# Patient Record
Sex: MALE | Race: WHITE | NOT HISPANIC OR LATINO | Employment: OTHER | URBAN - NONMETROPOLITAN AREA
[De-identification: names, ages, dates, MRNs, and addresses within clinical notes are randomized per-mention and may not be internally consistent; named-entity substitution may affect disease eponyms.]

---

## 2023-08-31 ENCOUNTER — OFFICE VISIT (OUTPATIENT)
Dept: CARDIOLOGY | Facility: CLINIC | Age: 68
End: 2023-08-31
Payer: MEDICARE

## 2023-08-31 VITALS
OXYGEN SATURATION: 96 % | WEIGHT: 171.4 LBS | HEART RATE: 73 BPM | DIASTOLIC BLOOD PRESSURE: 68 MMHG | SYSTOLIC BLOOD PRESSURE: 108 MMHG

## 2023-08-31 DIAGNOSIS — I25.10 CORONARY ARTERY DISEASE INVOLVING NATIVE CORONARY ARTERY OF NATIVE HEART WITHOUT ANGINA PECTORIS: Primary | ICD-10-CM

## 2023-08-31 DIAGNOSIS — E78.5 DYSLIPIDEMIA: ICD-10-CM

## 2023-08-31 DIAGNOSIS — I10 PRIMARY HYPERTENSION: ICD-10-CM

## 2023-08-31 PROCEDURE — 99204 OFFICE O/P NEW MOD 45 MIN: CPT | Performed by: PHYSICIAN ASSISTANT

## 2023-08-31 PROCEDURE — 93000 ELECTROCARDIOGRAM COMPLETE: CPT | Performed by: PHYSICIAN ASSISTANT

## 2023-08-31 PROCEDURE — 3078F DIAST BP <80 MM HG: CPT | Performed by: PHYSICIAN ASSISTANT

## 2023-08-31 PROCEDURE — 3074F SYST BP LT 130 MM HG: CPT | Performed by: PHYSICIAN ASSISTANT

## 2023-08-31 RX ORDER — ROSUVASTATIN CALCIUM 5 MG/1
5 TABLET, COATED ORAL NIGHTLY
COMMUNITY

## 2023-08-31 RX ORDER — OMEPRAZOLE 40 MG/1
40 CAPSULE, DELAYED RELEASE ORAL DAILY
COMMUNITY

## 2023-08-31 RX ORDER — CARVEDILOL 3.12 MG/1
3.12 TABLET ORAL 2 TIMES DAILY WITH MEALS
COMMUNITY

## 2023-08-31 RX ORDER — ASPIRIN 81 MG/1
81 TABLET ORAL DAILY
COMMUNITY

## 2023-08-31 NOTE — PROGRESS NOTES
Subjective   Lyndon Sotelo is a 68 y.o. male     Chief Complaint   Patient presents with    [facility]     Records in chart from  -  Stress and Echo records also in chart    Problem list  1.  Coronary artery disease  1.1 inferior STEMI June 2020 with PCI to the proximal RCA  1.2 follow-up catheterization the next day for stent placement the proximal to mid LAD.  1.3 stress test December 2022 with no evidence of ischemia preserved LV function.  2.  Preserved systolic function  3.  Dyslipidemia    HPI    Patient is a 68-year-old male who presents back to the office to [facility].  He was previously seen through the [facility].    Clinically, patient feels well.  Patient does not describe any chest pain or pressure.  No complaints of dyspnea.  No PND or orthopnea.    Patient does not palpitate nor does he complain of dysrhythmic symptoms.  Clinically, he is feeling remarkable.    Current Outpatient Medications   Medication Sig Dispense Refill    aspirin 81 MG EC tablet Take 1 tablet by mouth Daily.      carvedilol (COREG) 3.125 MG tablet Take 1 tablet by mouth 2 (Two) Times a Day With Meals.      omeprazole (priLOSEC) 40 MG capsule Take 1 capsule by mouth Daily.      rosuvastatin (CRESTOR) 5 MG tablet Take 1 tablet by mouth Every Night.       No current facility-administered medications for this visit.       Sulfa antibiotics and Lorcet [hydrocodone-acetaminophen]    Past Medical History:   Diagnosis Date    Acid reflux     Diverticulitis     Hyperlipidemia     Hypertension     TIA (transient ischemic attack)        Social History     Socioeconomic History    Marital status:    Tobacco Use    Smoking status: Every Day     Types: Cigarettes    Smokeless tobacco: Never   Substance and Sexual Activity    Alcohol use: Never    Drug use: Never    Sexual activity: Defer       Family History   Problem Relation Age of Onset    Rheumatic fever Mother     Emphysema Father        Review of Systems    Constitutional:  Positive for fatigue. Negative for chills and fever.   HENT:  Positive for rhinorrhea. Negative for congestion and sore throat.    Eyes:  Positive for visual disturbance (glasses).   Respiratory: Negative.  Negative for chest tightness, shortness of breath and wheezing.    Cardiovascular: Negative.  Negative for chest pain, palpitations and leg swelling.   Gastrointestinal: Negative.    Endocrine: Negative.    Genitourinary: Negative.    Musculoskeletal: Negative.  Negative for arthralgias, back pain and neck pain.   Skin: Negative.    Allergic/Immunologic: Positive for environmental allergies.   Neurological:  Positive for dizziness. Negative for weakness, numbness and headaches.   Hematological:  Bruises/bleeds easily.   Psychiatric/Behavioral: Negative.  Negative for sleep disturbance.      Objective   Vitals:    08/31/23 1420   BP: 108/68   BP Location: Left arm   Patient Position: Sitting   Cuff Size: Adult   Pulse: 73   SpO2: 96%   Weight: 77.7 kg (171 lb 6.4 oz)      /68 (BP Location: Left arm, Patient Position: Sitting, Cuff Size: Adult)   Pulse 73   Wt 77.7 kg (171 lb 6.4 oz)   SpO2 96%     Lab Results (most recent)       None            Physical Exam  Vitals and nursing note reviewed.   Constitutional:       General: He is not in acute distress.     Appearance: Normal appearance. He is well-developed.   HENT:      Head: Normocephalic and atraumatic.   Eyes:      General: No scleral icterus.        Right eye: No discharge.         Left eye: No discharge.      Conjunctiva/sclera: Conjunctivae normal.   Neck:      Vascular: No carotid bruit.   Cardiovascular:      Rate and Rhythm: Normal rate and regular rhythm.      Heart sounds: Normal heart sounds. No murmur heard.    No friction rub. No gallop.   Pulmonary:      Effort: Pulmonary effort is normal. No respiratory distress.      Breath sounds: Normal breath sounds. No wheezing or rales.   Chest:      Chest wall: No tenderness.    Musculoskeletal:      Right lower leg: No edema.      Left lower leg: No edema.   Skin:     General: Skin is warm and dry.      Coloration: Skin is not pale.      Findings: No erythema or rash.   Neurological:      Mental Status: He is alert and oriented to person, place, and time.      Cranial Nerves: No cranial nerve deficit.   Psychiatric:         Behavior: Behavior normal.       Procedure     ECG 12 Lead    Date/Time: 8/31/2023 2:36 PM  Performed by: Kelvin Woodward PA  Authorized by: Kelvin Woodward PA   Comparison: not compared with previous ECG   Comments: EKG demonstrates sinus rhythm at 67 bpm with nonspecific IVCD, first-degree AV block and no acute ST changes             Assessment & Plan     Problems Addressed this Visit          Cardiac and Vasculature    Primary hypertension    Relevant Medications    carvedilol (COREG) 3.125 MG tablet    Dyslipidemia    Coronary artery disease involving native coronary artery of native heart without angina pectoris - Primary    Relevant Medications    carvedilol (COREG) 3.125 MG tablet     Diagnoses         Codes Comments    Coronary artery disease involving native coronary artery of native heart without angina pectoris    -  Primary ICD-10-CM: I25.10  ICD-9-CM: 414.01     Dyslipidemia     ICD-10-CM: E78.5  ICD-9-CM: 272.4     Primary hypertension     ICD-10-CM: I10  ICD-9-CM: 401.9             Recommendation  1.  Patient is a 68-year-old male who presents to the office today and is doing well.  No complaints of angina, failure, or dysrhythmic symptoms.  Patient had stress test last December and there was no evidence of ischemia.  Strong systolic function.  We will continue medical therapy.    2.  I have reviewed lipid parameters in April from his primary care provider and LDL was significantly elevated.  He was placed on Crestor.  If tolerable, would like a higher dose of Crestor but he recently had labs performed by his primary approximately 3 weeks ago.  We  can wait to review those labs.    3 patient with baseline hypertension and seems on carvedilol.  He is doing well.  We will continue the same and see him back for follow-up in 6 months or sooner as symptoms discussed.  Follow-up with primary as scheduled.           Lyndon Ashleigh  reports that he has been smoking cigarettes. He has never used smokeless tobacco.. I have educated him on the risk of diseases from using tobacco products such as     I advised him to quit and he is not willing to quit.    I spent 5 minutes counseling the patient.          Advance Care Planning   ACP discussion was held with the patient during this visit. Patient does not have an advance directive, declines further assistance.          Electronically signed by:

## 2023-08-31 NOTE — LETTER
August 31, 2023       No Recipients    Patient: Lyndon Sotelo   YOB: 1955   Date of Visit: 8/31/2023       Dear Nava Schaffer MD,    Thank you for referring Lyndon Sotelo to me for evaluation. Below is a copy of my consult note.    If you have questions, please do not hesitate to call me. I look forward to following Lyndon along with you.         Sincerely,        DONY Lopez        CC:   No Recipients    Subjective  Lyndon Sotelo is a 68 y.o. male     Chief Complaint   Patient presents with    [facility]     Records in chart from  -  Stress and Echo records also in chart    Problem list  1.  Coronary artery disease  1.1 inferior STEMI June 2020 with PCI to the proximal RCA  1.2 follow-up catheterization the next day for stent placement the proximal to mid LAD.  1.3 stress test December 2022 with no evidence of ischemia preserved LV function.  2.  Preserved systolic function  3.  Dyslipidemia    HPI    Patient is a 68-year-old male who presents back to the office to [facility].  He was previously seen through the [facility].    Clinically, patient feels well.  Patient does not describe any chest pain or pressure.  No complaints of dyspnea.  No PND or orthopnea.    Patient does not palpitate nor does he complain of dysrhythmic symptoms.  Clinically, he is feeling remarkable.    Current Outpatient Medications   Medication Sig Dispense Refill    aspirin 81 MG EC tablet Take 1 tablet by mouth Daily.      carvedilol (COREG) 3.125 MG tablet Take 1 tablet by mouth 2 (Two) Times a Day With Meals.      omeprazole (priLOSEC) 40 MG capsule Take 1 capsule by mouth Daily.      rosuvastatin (CRESTOR) 5 MG tablet Take 1 tablet by mouth Every Night.       No current facility-administered medications for this visit.       Sulfa antibiotics and Lorcet [hydrocodone-acetaminophen]    Past Medical History:   Diagnosis Date    Acid reflux     Diverticulitis     Hyperlipidemia      Hypertension     TIA (transient ischemic attack)        Social History     Socioeconomic History    Marital status:    Tobacco Use    Smoking status: Every Day     Types: Cigarettes    Smokeless tobacco: Never   Substance and Sexual Activity    Alcohol use: Never    Drug use: Never    Sexual activity: Defer       Family History   Problem Relation Age of Onset    Rheumatic fever Mother     Emphysema Father        Review of Systems   Constitutional:  Positive for fatigue. Negative for chills and fever.   HENT:  Positive for rhinorrhea. Negative for congestion and sore throat.    Eyes:  Positive for visual disturbance (glasses).   Respiratory: Negative.  Negative for chest tightness, shortness of breath and wheezing.    Cardiovascular: Negative.  Negative for chest pain, palpitations and leg swelling.   Gastrointestinal: Negative.    Endocrine: Negative.    Genitourinary: Negative.    Musculoskeletal: Negative.  Negative for arthralgias, back pain and neck pain.   Skin: Negative.    Allergic/Immunologic: Positive for environmental allergies.   Neurological:  Positive for dizziness. Negative for weakness, numbness and headaches.   Hematological:  Bruises/bleeds easily.   Psychiatric/Behavioral: Negative.  Negative for sleep disturbance.      Objective  Vitals:    08/31/23 1420   BP: 108/68   BP Location: Left arm   Patient Position: Sitting   Cuff Size: Adult   Pulse: 73   SpO2: 96%   Weight: 77.7 kg (171 lb 6.4 oz)      /68 (BP Location: Left arm, Patient Position: Sitting, Cuff Size: Adult)   Pulse 73   Wt 77.7 kg (171 lb 6.4 oz)   SpO2 96%     Lab Results (most recent)       None            Physical Exam  Vitals and nursing note reviewed.   Constitutional:       General: He is not in acute distress.     Appearance: Normal appearance. He is well-developed.   HENT:      Head: Normocephalic and atraumatic.   Eyes:      General: No scleral icterus.        Right eye: No discharge.         Left  eye: No discharge.      Conjunctiva/sclera: Conjunctivae normal.   Neck:      Vascular: No carotid bruit.   Cardiovascular:      Rate and Rhythm: Normal rate and regular rhythm.      Heart sounds: Normal heart sounds. No murmur heard.    No friction rub. No gallop.   Pulmonary:      Effort: Pulmonary effort is normal. No respiratory distress.      Breath sounds: Normal breath sounds. No wheezing or rales.   Chest:      Chest wall: No tenderness.   Musculoskeletal:      Right lower leg: No edema.      Left lower leg: No edema.   Skin:     General: Skin is warm and dry.      Coloration: Skin is not pale.      Findings: No erythema or rash.   Neurological:      Mental Status: He is alert and oriented to person, place, and time.      Cranial Nerves: No cranial nerve deficit.   Psychiatric:         Behavior: Behavior normal.       Procedure    ECG 12 Lead    Date/Time: 8/31/2023 2:36 PM  Performed by: Kelvin Woodward PA  Authorized by: Kelvin Woodward PA   Comparison: not compared with previous ECG   Comments: EKG demonstrates sinus rhythm at 67 bpm with nonspecific IVCD, first-degree AV block and no acute ST changes             Assessment & Plan    Problems Addressed this Visit          Cardiac and Vasculature    Primary hypertension    Relevant Medications    carvedilol (COREG) 3.125 MG tablet    Dyslipidemia    Coronary artery disease involving native coronary artery of native heart without angina pectoris - Primary    Relevant Medications    carvedilol (COREG) 3.125 MG tablet     Diagnoses         Codes Comments    Coronary artery disease involving native coronary artery of native heart without angina pectoris    -  Primary ICD-10-CM: I25.10  ICD-9-CM: 414.01     Dyslipidemia     ICD-10-CM: E78.5  ICD-9-CM: 272.4     Primary hypertension     ICD-10-CM: I10  ICD-9-CM: 401.9             Recommendation  1.  Patient is a 68-year-old male who presents to the office today and is doing well.  No complaints of angina,  failure, or dysrhythmic symptoms.  Patient had stress test last December and there was no evidence of ischemia.  Strong systolic function.  We will continue medical therapy.    2.  I have reviewed lipid parameters in April from his primary care provider and LDL was significantly elevated.  He was placed on Crestor.  If tolerable, would like a higher dose of Crestor but he recently had labs performed by his primary approximately 3 weeks ago.  We can wait to review those labs.    3 patient with baseline hypertension and seems on carvedilol.  He is doing well.  We will continue the same and see him back for follow-up in 6 months or sooner as symptoms discussed.  Follow-up with primary as scheduled.           Lyndon Ashleigh  reports that he has been smoking cigarettes. He has never used smokeless tobacco.. I have educated him on the risk of diseases from using tobacco products such as     I advised him to quit and he is not willing to quit.    I spent 5 minutes counseling the patient.          Advance Care Planning   ACP discussion was held with the patient during this visit. Patient does not have an advance directive, declines further assistance.          Electronically signed by:

## 2024-03-18 ENCOUNTER — OFFICE VISIT (OUTPATIENT)
Dept: CARDIOLOGY | Facility: CLINIC | Age: 69
End: 2024-03-18
Payer: MEDICARE

## 2024-03-18 VITALS
HEART RATE: 93 BPM | SYSTOLIC BLOOD PRESSURE: 134 MMHG | WEIGHT: 173 LBS | DIASTOLIC BLOOD PRESSURE: 74 MMHG | OXYGEN SATURATION: 99 %

## 2024-03-18 DIAGNOSIS — I10 PRIMARY HYPERTENSION: ICD-10-CM

## 2024-03-18 DIAGNOSIS — R07.89 CHEST PAIN, ATYPICAL: ICD-10-CM

## 2024-03-18 DIAGNOSIS — I25.10 CORONARY ARTERY DISEASE INVOLVING NATIVE CORONARY ARTERY OF NATIVE HEART WITHOUT ANGINA PECTORIS: Primary | ICD-10-CM

## 2024-03-18 DIAGNOSIS — R06.02 SHORTNESS OF BREATH: ICD-10-CM

## 2024-03-18 PROCEDURE — 3078F DIAST BP <80 MM HG: CPT | Performed by: PHYSICIAN ASSISTANT

## 2024-03-18 PROCEDURE — 3075F SYST BP GE 130 - 139MM HG: CPT | Performed by: PHYSICIAN ASSISTANT

## 2024-03-18 PROCEDURE — 99214 OFFICE O/P EST MOD 30 MIN: CPT | Performed by: PHYSICIAN ASSISTANT

## 2024-03-18 PROCEDURE — 93000 ELECTROCARDIOGRAM COMPLETE: CPT | Performed by: PHYSICIAN ASSISTANT

## 2024-03-18 RX ORDER — MELOXICAM 15 MG/1
TABLET ORAL DAILY PRN
COMMUNITY
Start: 2024-02-21

## 2024-03-18 NOTE — PROGRESS NOTES
Problem list     Subjective   Lyndon Sotelo is a 68 y.o. male     Chief Complaint   Patient presents with   • Follow-up     6 months   Problem list  1.  Coronary artery disease  1.1 inferior STEMI June 2020 with PCI to the proximal RCA  1.2 follow-up catheterization the next day for stent placement the proximal to mid LAD.  1.3 stress test December 2022 with no evidence of ischemia preserved LV function.  2.  Preserved systolic function  3.  Dyslipidemia    HPI    Patient is a 68-year-old male who presents back to the office for routine cardiac assessment.  As above, his history of coronary disease with recent stress test in December 2022 with no evidence of ischemia.    Clinically, he feels well.  He occasionally might feel some discomfort.  He describes waking up in the mornings and feeling some discomfort but does not feel it during the day.  He is not sure if it is possibly related to GI or other issues.  He has a degree of dyspnea.  He does not describe any progressive shortness of breath.  His dyspnea appears to be mild and stable.  He does not describe symptoms like what he felt previous to his myocardial infarction's in the past.  He does not describe PND or orthopnea.    No complaints of palpitations nor does he complain of dysrhythmic symptoms.  He is stable otherwise.      Current Outpatient Medications on File Prior to Visit   Medication Sig Dispense Refill   • aspirin 81 MG EC tablet Take 1 tablet by mouth Daily.     • carvedilol (COREG) 3.125 MG tablet Take 1 tablet by mouth 2 (Two) Times a Day With Meals.     • meloxicam (MOBIC) 15 MG tablet Daily As Needed.     • omeprazole (priLOSEC) 40 MG capsule Take 1 capsule by mouth Daily.     • rosuvastatin (CRESTOR) 5 MG tablet Take 1 tablet by mouth Every Night.       No current facility-administered medications on file prior to visit.       Sulfa antibiotics and Lorcet [hydrocodone-acetaminophen]    Past Medical History:   Diagnosis Date   • Acid reflux    •  Diverticulitis    • Hyperlipidemia    • Hypertension    • TIA (transient ischemic attack)        Social History     Socioeconomic History   • Marital status:    Tobacco Use   • Smoking status: Every Day     Types: Cigarettes   • Smokeless tobacco: Never   Substance and Sexual Activity   • Alcohol use: Never   • Drug use: Never   • Sexual activity: Defer       Family History   Problem Relation Age of Onset   • Rheumatic fever Mother    • Emphysema Father        Review of Systems   Constitutional:  Negative for activity change, appetite change, chills, fatigue and fever.   HENT: Negative.  Negative for congestion, sinus pressure and sinus pain.    Eyes: Negative.  Negative for visual disturbance.   Respiratory:  Positive for shortness of breath. Negative for apnea, cough, chest tightness and wheezing.    Cardiovascular:  Positive for chest pain. Negative for palpitations and leg swelling.   Gastrointestinal: Negative.  Negative for blood in stool.   Endocrine: Negative.  Negative for cold intolerance and heat intolerance.   Genitourinary: Negative.  Negative for hematuria.   Musculoskeletal: Negative.  Negative for gait problem.   Skin: Negative.  Negative for color change, rash and wound.   Allergic/Immunologic: Negative.  Negative for environmental allergies and food allergies.   Neurological: Negative.  Negative for dizziness, syncope, weakness, light-headedness, numbness and headaches.   Hematological: Negative.  Does not bruise/bleed easily.   Psychiatric/Behavioral:  Negative for sleep disturbance.        Objective   Vitals:    03/18/24 1420   BP: 134/74   BP Location: Left arm   Patient Position: Sitting   Cuff Size: Adult   Pulse: 93   SpO2: 99%   Weight: 78.5 kg (173 lb)      /74 (BP Location: Left arm, Patient Position: Sitting, Cuff Size: Adult)   Pulse 93   Wt 78.5 kg (173 lb)   SpO2 99%     Lab Results (most recent)       None            Physical Exam  Vitals and nursing note reviewed.    Constitutional:       General: He is not in acute distress.     Appearance: Normal appearance. He is well-developed.   HENT:      Head: Normocephalic and atraumatic.   Eyes:      General: No scleral icterus.        Right eye: No discharge.         Left eye: No discharge.      Conjunctiva/sclera: Conjunctivae normal.   Neck:      Vascular: No carotid bruit.   Cardiovascular:      Rate and Rhythm: Normal rate and regular rhythm.      Heart sounds: Normal heart sounds. No murmur heard.     No friction rub. No gallop.   Pulmonary:      Effort: Pulmonary effort is normal. No respiratory distress.      Breath sounds: Normal breath sounds. No wheezing or rales.   Chest:      Chest wall: No tenderness.   Musculoskeletal:      Right lower leg: No edema.      Left lower leg: No edema.   Skin:     General: Skin is warm and dry.      Coloration: Skin is not pale.      Findings: No erythema or rash.   Neurological:      Mental Status: He is alert and oriented to person, place, and time.      Cranial Nerves: No cranial nerve deficit.   Psychiatric:         Behavior: Behavior normal.       Procedure     ECG 12 Lead    Date/Time: 3/18/2024 2:24 PM  Performed by: Kelvin Woodward PA    Authorized by: Kelvin Woodward PA  Comparison: compared with previous ECG from 8/31/2023  Comments: EKG demonstrates sinus rhythm at 70 bpm, low voltage and no acute ST changes           Assessment & Plan     Problems Addressed this Visit          Cardiac and Vasculature    Primary hypertension    Coronary artery disease involving native coronary artery of native heart without angina pectoris - Primary       Pulmonary and Pneumonias    Shortness of breath       Symptoms and Signs    Chest pain, atypical     Diagnoses         Codes Comments    Coronary artery disease involving native coronary artery of native heart without angina pectoris    -  Primary ICD-10-CM: I25.10  ICD-9-CM: 414.01     Chest pain, atypical     ICD-10-CM: R07.89  ICD-9-CM:  786.59     Primary hypertension     ICD-10-CM: I10  ICD-9-CM: 401.9     Shortness of breath     ICD-10-CM: R06.02  ICD-9-CM: 786.05           Recommendation  1.  Patient is a 68-year-old male who presents to the office to be evaluated.  Patient has done well.  He has history of coronary disease.  He describes having chest pain that is atypical.  He experiences symptoms whenever he lies flat in the mornings.  He does not experience this with exertion.  We discussed testing, but he feels he is doing well and this is not similar to what he felt with previous stenting.  I would be suspicious of noncardiac etiologies.  For now, we can monitor but if this worsens or his dyspnea worsens, I want him to call the office.    2.  Patient with coronary disease currently on aspirin and statin therapy.  He has labs routinely monitored by primary.    3.  Patient's blood pressure currently stable on medical regimen and I will make no change at this time.  We will see him back for follow-up in 6 months or sooner if needed.  Follow-up with primary as scheduled.             Lyndon Sotelo  reports that he has been smoking cigarettes. He has never used smokeless tobacco. I have educated him on the risk of diseases from using tobacco products such as cancer, COPD, and heart disease.     I advised him to quit and he is not willing to quit.    I spent 3  minutes counseling the patient.          Patient did not bring med list or medicine bottles to appointment, med list has been reviewed and updated based on patient's knowledge of their meds.      Advance Care Planning   ACP discussion was declined by the patient. Patient does not have an advance directive, declines further assistance.        Electronically signed by:

## 2024-03-18 NOTE — LETTER
March 19, 2024       No Recipients    Patient: Lyndon Sotelo   YOB: 1955   Date of Visit: 3/18/2024       Dear Nava Schaffer MD    Lyndon Sotelo was in my office today. Below is a copy of my note.    If you have questions, please do not hesitate to call me. I look forward to following Lyndon along with you.         Sincerely,        DONY Lopez        CC:   No Recipients    Problem list     Subjective  Lyndon Sotelo is a 68 y.o. male     Chief Complaint   Patient presents with   • Follow-up     6 months   Problem list  1.  Coronary artery disease  1.1 inferior STEMI June 2020 with PCI to the proximal RCA  1.2 follow-up catheterization the next day for stent placement the proximal to mid LAD.  1.3 stress test December 2022 with no evidence of ischemia preserved LV function.  2.  Preserved systolic function  3.  Dyslipidemia    HPI    Patient is a 68-year-old male who presents back to the office for routine cardiac assessment.  As above, his history of coronary disease with recent stress test in December 2022 with no evidence of ischemia.    Clinically, he feels well.  He occasionally might feel some discomfort.  He describes waking up in the mornings and feeling some discomfort but does not feel it during the day.  He is not sure if it is possibly related to GI or other issues.  He has a degree of dyspnea.  He does not describe any progressive shortness of breath.  His dyspnea appears to be mild and stable.  He does not describe symptoms like what he felt previous to his myocardial infarction's in the past.  He does not describe PND or orthopnea.    No complaints of palpitations nor does he complain of dysrhythmic symptoms.  He is stable otherwise.      Current Outpatient Medications on File Prior to Visit   Medication Sig Dispense Refill   • aspirin 81 MG EC tablet Take 1 tablet by mouth Daily.     • carvedilol (COREG) 3.125 MG tablet Take 1 tablet by mouth 2 (Two) Times a Day With Meals.      • meloxicam (MOBIC) 15 MG tablet Daily As Needed.     • omeprazole (priLOSEC) 40 MG capsule Take 1 capsule by mouth Daily.     • rosuvastatin (CRESTOR) 5 MG tablet Take 1 tablet by mouth Every Night.       No current facility-administered medications on file prior to visit.       Sulfa antibiotics and Lorcet [hydrocodone-acetaminophen]    Past Medical History:   Diagnosis Date   • Acid reflux    • Diverticulitis    • Hyperlipidemia    • Hypertension    • TIA (transient ischemic attack)        Social History     Socioeconomic History   • Marital status:    Tobacco Use   • Smoking status: Every Day     Types: Cigarettes   • Smokeless tobacco: Never   Substance and Sexual Activity   • Alcohol use: Never   • Drug use: Never   • Sexual activity: Defer       Family History   Problem Relation Age of Onset   • Rheumatic fever Mother    • Emphysema Father        Review of Systems   Constitutional:  Negative for activity change, appetite change, chills, fatigue and fever.   HENT: Negative.  Negative for congestion, sinus pressure and sinus pain.    Eyes: Negative.  Negative for visual disturbance.   Respiratory:  Positive for shortness of breath. Negative for apnea, cough, chest tightness and wheezing.    Cardiovascular:  Positive for chest pain. Negative for palpitations and leg swelling.   Gastrointestinal: Negative.  Negative for blood in stool.   Endocrine: Negative.  Negative for cold intolerance and heat intolerance.   Genitourinary: Negative.  Negative for hematuria.   Musculoskeletal: Negative.  Negative for gait problem.   Skin: Negative.  Negative for color change, rash and wound.   Allergic/Immunologic: Negative.  Negative for environmental allergies and food allergies.   Neurological: Negative.  Negative for dizziness, syncope, weakness, light-headedness, numbness and headaches.   Hematological: Negative.  Does not bruise/bleed easily.   Psychiatric/Behavioral:  Negative for sleep disturbance.         Objective  Vitals:    03/18/24 1420   BP: 134/74   BP Location: Left arm   Patient Position: Sitting   Cuff Size: Adult   Pulse: 93   SpO2: 99%   Weight: 78.5 kg (173 lb)      /74 (BP Location: Left arm, Patient Position: Sitting, Cuff Size: Adult)   Pulse 93   Wt 78.5 kg (173 lb)   SpO2 99%     Lab Results (most recent)       None            Physical Exam  Vitals and nursing note reviewed.   Constitutional:       General: He is not in acute distress.     Appearance: Normal appearance. He is well-developed.   HENT:      Head: Normocephalic and atraumatic.   Eyes:      General: No scleral icterus.        Right eye: No discharge.         Left eye: No discharge.      Conjunctiva/sclera: Conjunctivae normal.   Neck:      Vascular: No carotid bruit.   Cardiovascular:      Rate and Rhythm: Normal rate and regular rhythm.      Heart sounds: Normal heart sounds. No murmur heard.     No friction rub. No gallop.   Pulmonary:      Effort: Pulmonary effort is normal. No respiratory distress.      Breath sounds: Normal breath sounds. No wheezing or rales.   Chest:      Chest wall: No tenderness.   Musculoskeletal:      Right lower leg: No edema.      Left lower leg: No edema.   Skin:     General: Skin is warm and dry.      Coloration: Skin is not pale.      Findings: No erythema or rash.   Neurological:      Mental Status: He is alert and oriented to person, place, and time.      Cranial Nerves: No cranial nerve deficit.   Psychiatric:         Behavior: Behavior normal.         Procedure    ECG 12 Lead    Date/Time: 3/18/2024 2:24 PM  Performed by: Kelvin Woodward PA    Authorized by: Kelvin Woodward PA  Comparison: compared with previous ECG from 8/31/2023             Assessment & Plan    Problems Addressed this Visit          Cardiac and Vasculature    Primary hypertension    Coronary artery disease involving native coronary artery of native heart without angina pectoris - Primary       Pulmonary and  Pneumonias    Shortness of breath       Symptoms and Signs    Chest pain, atypical     Diagnoses         Codes Comments    Coronary artery disease involving native coronary artery of native heart without angina pectoris    -  Primary ICD-10-CM: I25.10  ICD-9-CM: 414.01     Chest pain, atypical     ICD-10-CM: R07.89  ICD-9-CM: 786.59     Primary hypertension     ICD-10-CM: I10  ICD-9-CM: 401.9     Shortness of breath     ICD-10-CM: R06.02  ICD-9-CM: 786.05           Recommendation  1.  Patient is a 68-year-old male who presents to the office to be evaluated.  Patient has done well.  He has history of coronary disease.  He describes having chest pain that is atypical.  He experiences symptoms whenever he lies flat in the mornings.  He does not experience this with exertion.  We discussed testing, but he feels he is doing well and this is not similar to what he felt with previous stenting.  I would be suspicious of noncardiac etiologies.  For now, we can monitor but if this worsens or his dyspnea worsens, I want him to call the office.    2.  Patient with coronary disease currently on aspirin and statin therapy.  He has labs routinely monitored by primary.    3.  Patient's blood pressure currently stable on medical regimen and I will make no change at this time.  We will see him back for follow-up in 6 months or sooner if needed.  Follow-up with primary as scheduled.             Lyndon Ashleigh  reports that he has been smoking cigarettes. He has never used smokeless tobacco. I have educated him on the risk of diseases from using tobacco products such as cancer, COPD, and heart disease.     I advised him to quit and he is not willing to quit.    I spent 3  minutes counseling the patient.          Patient did not bring med list or medicine bottles to appointment, med list has been reviewed and updated based on patient's knowledge of their meds.      Advance Care Planning  ACP discussion was declined by the patient. Patient  does not have an advance directive, declines further assistance.        Electronically signed by:

## 2024-03-19 PROBLEM — R07.89 CHEST PAIN, ATYPICAL: Status: ACTIVE | Noted: 2024-03-19

## 2024-03-19 PROBLEM — R06.02 SHORTNESS OF BREATH: Status: ACTIVE | Noted: 2024-03-19

## 2024-06-04 RX ORDER — CARVEDILOL 3.12 MG/1
3.12 TABLET ORAL 2 TIMES DAILY WITH MEALS
Qty: 180 TABLET | Refills: 1 | Status: SHIPPED | OUTPATIENT
Start: 2024-06-04

## 2024-07-08 ENCOUNTER — TELEPHONE (OUTPATIENT)
Dept: CARDIOLOGY | Facility: CLINIC | Age: 69
End: 2024-07-08
Payer: MEDICARE

## 2024-07-08 NOTE — TELEPHONE ENCOUNTER
Hub staff attempted to follow warm transfer process and was unsuccessful     Caller: Lyndon Sotelo    Relationship to patient: Self    Best call back number: [phone]    Patient is needing: PLEASE CALL BACK PT TO DISCUSS HIS SYMPTOMS AND SCHEDULING SOONER APPT THAN IS ALREADY IN CHART.     7.7.24 SYMPTOMSRAPID HB, ACHING JAWS. 7.8.24 SYMPTOMS CHEST TIGHT, WOOZY, SHOOTING PAINS IN LEGS THAT WENT AWAY WHEN HE STARTED WALKING. PT DID NOT HAVE SYMPTOMS ON THIS PHONE CALL.

## 2024-07-08 NOTE — TELEPHONE ENCOUNTER
"Pt LVM stating he had \"problems yesterday.\" He stated his jaw hurt and his heart was beating fast for 2 or so hours. Stated that he woke numerous times w/ sharp pain in his left leg. Stated he had chest tightness this am w/ breakfast and feels \"woozy.\"          First attempt to reach pt. Left a voicemail for pt to return my call at [phone].       RELAY        Please find out how pt's vitals are running. Any symptoms of illness: fever, cough, etc. Would recommend ER for continued or worsening symptoms.   "

## 2024-07-09 ENCOUNTER — OFFICE VISIT (OUTPATIENT)
Dept: CARDIOLOGY | Facility: CLINIC | Age: 69
End: 2024-07-09
Payer: MEDICARE

## 2024-07-09 VITALS
WEIGHT: 168.8 LBS | HEIGHT: 67 IN | SYSTOLIC BLOOD PRESSURE: 111 MMHG | HEART RATE: 71 BPM | DIASTOLIC BLOOD PRESSURE: 69 MMHG | BODY MASS INDEX: 26.49 KG/M2 | OXYGEN SATURATION: 96 %

## 2024-07-09 DIAGNOSIS — R06.02 SHORTNESS OF BREATH: Primary | ICD-10-CM

## 2024-07-09 DIAGNOSIS — Z12.5 SPECIAL SCREENING, PROSTATE CANCER: ICD-10-CM

## 2024-07-09 DIAGNOSIS — I10 PRIMARY HYPERTENSION: ICD-10-CM

## 2024-07-09 DIAGNOSIS — R07.2 PRECORDIAL PAIN: ICD-10-CM

## 2024-07-09 DIAGNOSIS — I25.119 CORONARY ARTERY DISEASE INVOLVING NATIVE CORONARY ARTERY OF NATIVE HEART WITH ANGINA PECTORIS: ICD-10-CM

## 2024-07-09 PROCEDURE — 93000 ELECTROCARDIOGRAM COMPLETE: CPT | Performed by: PHYSICIAN ASSISTANT

## 2024-07-09 PROCEDURE — 1160F RVW MEDS BY RX/DR IN RCRD: CPT | Performed by: PHYSICIAN ASSISTANT

## 2024-07-09 PROCEDURE — 3074F SYST BP LT 130 MM HG: CPT | Performed by: PHYSICIAN ASSISTANT

## 2024-07-09 PROCEDURE — 1159F MED LIST DOCD IN RCRD: CPT | Performed by: PHYSICIAN ASSISTANT

## 2024-07-09 PROCEDURE — 99214 OFFICE O/P EST MOD 30 MIN: CPT | Performed by: PHYSICIAN ASSISTANT

## 2024-07-09 PROCEDURE — 3078F DIAST BP <80 MM HG: CPT | Performed by: PHYSICIAN ASSISTANT

## 2024-07-09 RX ORDER — NITROGLYCERIN 0.4 MG/1
TABLET SUBLINGUAL
Qty: 25 TABLET | Refills: 3 | Status: CANCELLED | OUTPATIENT
Start: 2024-07-09

## 2024-07-09 NOTE — PROGRESS NOTES
Advance Care Planning   ACP discussion was held with the patient during this visit. Patient does not have an advance directive, declines further assistance.   Problem list     Subjective   Lyndon Sotelo is a 69 y.o. male     Chief Complaint   Patient presents with   • Follow-up   • Chest Pain     Tightness in chest, jaw pain   • Rapid Heart Rate   Problem list  1.  Coronary artery disease  1.1 inferior STEMI June 2020 with PCI to the proximal RCA  1.2 follow-up catheterization the next day for stent placement the proximal to mid LAD.  1.3 stress test December 2022 with no evidence of ischemia preserved LV function.  2.  Preserved systolic function  3.  Dyslipidemia    HPI  The patient presents to clinic today for his request.  He presents because of recent increase in symptoms.  Over the last couple of weeks, he has started noticing some degree of bilateral, and mostly left jaw pain.  This has intensified as of recent.  He over the past few days also started noticing some degree of chest tightness.  He has increasing dyspnea when experiencing the same.  Outside of the above symptoms, he is also noted a rapid heart rate at times, typically associated his palpitations and tachycardia.  This lasts only a few seconds.  His chest tightness and jaw pain last only a few minutes.  This is similar but not exactly what he noted prior to intervention of the proximal RCA and LAD previously.  There has been no PND nor orthopnea.  He denies syncope but has some degree of dizziness.  He is concerned enough with symptoms that he would like repeat evaluation.  He presents today in that setting.    Current Outpatient Medications on File Prior to Visit   Medication Sig Dispense Refill   • aspirin 81 MG EC tablet Take 1 tablet by mouth Daily.     • carvedilol (COREG) 3.125 MG tablet Take 1 tablet by mouth 2 (Two) Times a Day With Meals. 180 tablet 1   • meloxicam (MOBIC) 15 MG tablet Daily As Needed.     • rosuvastatin (CRESTOR) 5 MG  tablet Take 1 tablet by mouth Every Night.     • [DISCONTINUED] omeprazole (priLOSEC) 40 MG capsule Take 1 capsule by mouth Daily.       No current facility-administered medications on file prior to visit.       Sulfa antibiotics and Lorcet [hydrocodone-acetaminophen]    Past Medical History:   Diagnosis Date   • Acid reflux    • Diverticulitis    • Hyperlipidemia    • Hypertension    • Myocardial infarction 07-   • Stroke 10-   • TIA (transient ischemic attack)        Social History     Socioeconomic History   • Marital status:    Tobacco Use   • Smoking status: Every Day     Current packs/day: 0.50     Average packs/day: 0.5 packs/day for 50.0 years (25.0 ttl pk-yrs)     Types: Cigarettes   • Smokeless tobacco: Never   Substance and Sexual Activity   • Alcohol use: Never   • Drug use: Never   • Sexual activity: Yes     Partners: Female     Birth control/protection: None       Family History   Problem Relation Age of Onset   • Rheumatic fever Mother    • Heart disease Mother    • Emphysema Father    • Prostate cancer Father        Review of Systems   Constitutional:  Positive for fatigue. Negative for chills, diaphoresis and fever.   HENT: Negative.     Eyes:  Positive for visual disturbance.   Respiratory:  Positive for shortness of breath. Negative for apnea, cough, chest tightness and wheezing.    Cardiovascular:  Positive for chest pain and palpitations. Negative for leg swelling.   Gastrointestinal:  Positive for abdominal pain. Negative for blood in stool.   Endocrine: Negative.    Genitourinary: Negative.  Negative for hematuria.   Musculoskeletal:  Positive for arthralgias. Negative for back pain, myalgias, neck pain and neck stiffness.   Skin: Negative.  Negative for rash and wound.   Allergic/Immunologic: Negative.  Negative for environmental allergies and food allergies.   Neurological:  Positive for light-headedness. Negative for dizziness, syncope, weakness, numbness and headaches.  "  Hematological:  Bruises/bleeds easily.   Psychiatric/Behavioral: Negative.  Negative for sleep disturbance.        Objective   Vitals:    07/09/24 1512   BP: 111/69   Pulse: 71   SpO2: 96%   Weight: 76.6 kg (168 lb 12.8 oz)   Height: 170.2 cm (67\")      /69   Pulse 71   Ht 170.2 cm (67\")   Wt 76.6 kg (168 lb 12.8 oz)   SpO2 96%   BMI 26.44 kg/m²    Lab Results (most recent)       None          Physical Exam  Vitals and nursing note reviewed.   Constitutional:       General: He is not in acute distress.     Appearance: He is well-developed.   HENT:      Head: Normocephalic and atraumatic.   Eyes:      Conjunctiva/sclera: Conjunctivae normal.      Pupils: Pupils are equal, round, and reactive to light.   Neck:      Vascular: No JVD.      Trachea: No tracheal deviation.   Cardiovascular:      Rate and Rhythm: Normal rate and regular rhythm.      Heart sounds: Normal heart sounds.   Pulmonary:      Effort: Pulmonary effort is normal.      Breath sounds: Normal breath sounds.   Abdominal:      General: Bowel sounds are normal. There is no distension.      Palpations: Abdomen is soft. There is no mass.      Tenderness: There is no abdominal tenderness. There is no guarding or rebound.   Musculoskeletal:         General: No tenderness or deformity. Normal range of motion.      Cervical back: Normal range of motion and neck supple.   Skin:     General: Skin is warm and dry.      Coloration: Skin is not pale.      Findings: No erythema or rash.   Neurological:      Mental Status: He is alert and oriented to person, place, and time.   Psychiatric:         Behavior: Behavior normal.         Thought Content: Thought content normal.         Judgment: Judgment normal.         Procedure     ECG 12 Lead    Date/Time: 7/9/2024 3:17 PM  Performed by: Andre Vega PA    Authorized by: Andre Vega PA  Comparison: compared with previous ECG from 3/18/2024  Comparison to previous ECG: Sinus rhythm, rate 68, normal " axis, no acute changes noted.           Assessment & Plan      Diagnosis Plan   1. Shortness of breath  Stress Test With Myocardial Perfusion One Day    Adult Transthoracic Echo Complete W/ Cont if Necessary Per Protocol    Holter Monitor - 72 Hour Up To 15 Days    CBC & Differential    Comprehensive Metabolic Panel    Lipid Panel    TSH      2. Primary hypertension  Stress Test With Myocardial Perfusion One Day    Adult Transthoracic Echo Complete W/ Cont if Necessary Per Protocol    Holter Monitor - 72 Hour Up To 15 Days    CBC & Differential    Comprehensive Metabolic Panel    Lipid Panel    TSH      3. Precordial pain  Stress Test With Myocardial Perfusion One Day    Adult Transthoracic Echo Complete W/ Cont if Necessary Per Protocol    Holter Monitor - 72 Hour Up To 15 Days    CBC & Differential    Comprehensive Metabolic Panel    Lipid Panel    TSH      4. Coronary artery disease involving native coronary artery of native heart with angina pectoris  Stress Test With Myocardial Perfusion One Day    Adult Transthoracic Echo Complete W/ Cont if Necessary Per Protocol    Holter Monitor - 72 Hour Up To 15 Days    CBC & Differential    Comprehensive Metabolic Panel    Lipid Panel    TSH      5. Special screening, prostate cancer  Stress Test With Myocardial Perfusion One Day    Adult Transthoracic Echo Complete W/ Cont if Necessary Per Protocol    Holter Monitor - 72 Hour Up To 15 Days    CBC & Differential    Comprehensive Metabolic Panel    Lipid Panel    TSH    PSA Screen          1.  The patient presents because of increasing chest discomfort, jaw pain, tachycardia/palpitations, and symptoms otherwise as above.  He is concerned and would like further evaluation.  EKG today fails to indicate any acute abnormalities.  Exam is mostly stable for this gentleman.  We have reviewed this in detail with him.    2.  I would like to schedule for stress test.  I have asked that this be performed soon as possible.  This will  serve for ischemia assessment.    3.  We will also schedule for an echo.  We can reevaluate systolic function and cardiac parameters otherwise.    4.  With dysrhythmic symptoms as above, we will schedule for a Holter monitor.    5.  I would also like to repeat routine laboratories, as above.    5.  For now, we will continue medical therapy.  We can adjust further as we can see results.  For recurrent symptoms he will return to clinic.  For acute symptoms he will proceed to the emergency room.         Lyndon Sotelo  reports that he has been smoking cigarettes. He has a 25 pack-year smoking history. He has never used smokeless tobacco. I have educated him on the risk of diseases from using tobacco products such as cancer, COPD, and heart disease.     I advised him to quit and he is not willing to quit.    I spent 3  minutes counseling the patient.                       Electronically signed by:

## 2024-07-09 NOTE — TELEPHONE ENCOUNTER
Kelvin Woodward PA Sawyers, Emily  Caller: Unspecified (Yesterday,  9:02 AM)  Have patient wear a 2-week event monitor.  I would also consider stress testing and echocardiogram.  If patient is willing, I would recommend it.  Make sure he has nitroglycerin available for chest pain and any chest pain, not resolved with nitroglycerin, I recommend ER evaluation.  Order test stat and make a follow-up to review testing

## 2024-07-09 NOTE — TELEPHONE ENCOUNTER
First attempt to reach pt. Left a voicemail for pt to return my call at [phone].       RELAY      Kelvin would like a 2 week heart monitor- would pt like to pick it up or have it mailed?   He also wants a stress and echo done STAT- is pt willing to do testing?   We will send In nitro for pt to take to help w/ CP. If nitro doesn't not resolve CP, pt needs to proceed to the ER.   We will have the  schedule a follow up sooner, once we know testing date

## 2024-07-10 ENCOUNTER — LAB (OUTPATIENT)
Dept: LAB | Facility: HOSPITAL | Age: 69
End: 2024-07-10
Payer: MEDICARE

## 2024-07-10 DIAGNOSIS — I25.119 CORONARY ARTERY DISEASE INVOLVING NATIVE CORONARY ARTERY OF NATIVE HEART WITH ANGINA PECTORIS: ICD-10-CM

## 2024-07-10 DIAGNOSIS — R06.02 SHORTNESS OF BREATH: ICD-10-CM

## 2024-07-10 DIAGNOSIS — R07.2 PRECORDIAL PAIN: ICD-10-CM

## 2024-07-10 DIAGNOSIS — Z12.5 SPECIAL SCREENING, PROSTATE CANCER: ICD-10-CM

## 2024-07-10 DIAGNOSIS — I10 PRIMARY HYPERTENSION: ICD-10-CM

## 2024-07-10 LAB
ALBUMIN SERPL-MCNC: 3.6 G/DL (ref 3.5–5.2)
ALBUMIN/GLOB SERPL: 1.5 G/DL
ALP SERPL-CCNC: 106 U/L (ref 39–117)
ALT SERPL W P-5'-P-CCNC: 17 U/L (ref 1–41)
ANION GAP SERPL CALCULATED.3IONS-SCNC: 9.6 MMOL/L (ref 5–15)
AST SERPL-CCNC: 18 U/L (ref 1–40)
BASOPHILS # BLD AUTO: 0.04 10*3/MM3 (ref 0–0.2)
BASOPHILS NFR BLD AUTO: 0.5 % (ref 0–1.5)
BILIRUB SERPL-MCNC: 0.2 MG/DL (ref 0–1.2)
BUN SERPL-MCNC: 18 MG/DL (ref 8–23)
BUN/CREAT SERPL: 24.7 (ref 7–25)
CALCIUM SPEC-SCNC: 9.1 MG/DL (ref 8.6–10.5)
CHLORIDE SERPL-SCNC: 108 MMOL/L (ref 98–107)
CHOLEST SERPL-MCNC: 96 MG/DL (ref 0–200)
CO2 SERPL-SCNC: 24.4 MMOL/L (ref 22–29)
CREAT SERPL-MCNC: 0.73 MG/DL (ref 0.76–1.27)
DEPRECATED RDW RBC AUTO: 48.9 FL (ref 37–54)
EGFRCR SERPLBLD CKD-EPI 2021: 99.1 ML/MIN/1.73
EOSINOPHIL # BLD AUTO: 0.37 10*3/MM3 (ref 0–0.4)
EOSINOPHIL NFR BLD AUTO: 4.7 % (ref 0.3–6.2)
ERYTHROCYTE [DISTWIDTH] IN BLOOD BY AUTOMATED COUNT: 13.6 % (ref 12.3–15.4)
GLOBULIN UR ELPH-MCNC: 2.4 GM/DL
GLUCOSE SERPL-MCNC: 101 MG/DL (ref 65–99)
HCT VFR BLD AUTO: 44.1 % (ref 37.5–51)
HDLC SERPL-MCNC: 26 MG/DL (ref 40–60)
HGB BLD-MCNC: 14.1 G/DL (ref 13–17.7)
IMM GRANULOCYTES # BLD AUTO: 0.02 10*3/MM3 (ref 0–0.05)
IMM GRANULOCYTES NFR BLD AUTO: 0.3 % (ref 0–0.5)
LDLC SERPL CALC-MCNC: 55 MG/DL (ref 0–100)
LDLC/HDLC SERPL: 2.15 {RATIO}
LYMPHOCYTES # BLD AUTO: 1.58 10*3/MM3 (ref 0.7–3.1)
LYMPHOCYTES NFR BLD AUTO: 20 % (ref 19.6–45.3)
MCH RBC QN AUTO: 31.1 PG (ref 26.6–33)
MCHC RBC AUTO-ENTMCNC: 32 G/DL (ref 31.5–35.7)
MCV RBC AUTO: 97.4 FL (ref 79–97)
MONOCYTES # BLD AUTO: 0.6 10*3/MM3 (ref 0.1–0.9)
MONOCYTES NFR BLD AUTO: 7.6 % (ref 5–12)
NEUTROPHILS NFR BLD AUTO: 5.29 10*3/MM3 (ref 1.7–7)
NEUTROPHILS NFR BLD AUTO: 66.9 % (ref 42.7–76)
NRBC BLD AUTO-RTO: 0 /100 WBC (ref 0–0.2)
PLATELET # BLD AUTO: 165 10*3/MM3 (ref 140–450)
PMV BLD AUTO: 11.7 FL (ref 6–12)
POTASSIUM SERPL-SCNC: 4.8 MMOL/L (ref 3.5–5.2)
PROT SERPL-MCNC: 6 G/DL (ref 6–8.5)
RBC # BLD AUTO: 4.53 10*6/MM3 (ref 4.14–5.8)
SODIUM SERPL-SCNC: 142 MMOL/L (ref 136–145)
TRIGL SERPL-MCNC: 71 MG/DL (ref 0–150)
TSH SERPL DL<=0.05 MIU/L-ACNC: 2.32 UIU/ML (ref 0.27–4.2)
VLDLC SERPL-MCNC: 15 MG/DL (ref 5–40)
WBC NRBC COR # BLD AUTO: 7.9 10*3/MM3 (ref 3.4–10.8)

## 2024-07-10 PROCEDURE — 84443 ASSAY THYROID STIM HORMONE: CPT

## 2024-07-10 PROCEDURE — 80061 LIPID PANEL: CPT

## 2024-07-10 PROCEDURE — 80053 COMPREHEN METABOLIC PANEL: CPT

## 2024-07-10 PROCEDURE — 36415 COLL VENOUS BLD VENIPUNCTURE: CPT

## 2024-07-10 PROCEDURE — 85025 COMPLETE CBC W/AUTO DIFF WBC: CPT

## 2024-07-10 PROCEDURE — G0103 PSA SCREENING: HCPCS

## 2024-07-11 LAB — PSA SERPL-MCNC: 1.21 NG/ML (ref 0–4)

## 2024-07-19 ENCOUNTER — HOSPITAL ENCOUNTER (OUTPATIENT)
Dept: CARDIOLOGY | Facility: HOSPITAL | Age: 69
Discharge: HOME | End: 2024-07-19
Payer: MEDICARE

## 2024-07-19 DIAGNOSIS — R06.02 SHORTNESS OF BREATH: ICD-10-CM

## 2024-07-19 DIAGNOSIS — R07.2 PRECORDIAL PAIN: ICD-10-CM

## 2024-07-19 DIAGNOSIS — I25.119 CORONARY ARTERY DISEASE INVOLVING NATIVE CORONARY ARTERY OF NATIVE HEART WITH ANGINA PECTORIS: ICD-10-CM

## 2024-07-19 DIAGNOSIS — I10 PRIMARY HYPERTENSION: ICD-10-CM

## 2024-07-19 DIAGNOSIS — Z12.5 SPECIAL SCREENING, PROSTATE CANCER: ICD-10-CM

## 2024-07-19 LAB
AORTIC DIMENSIONLESS INDEX: 0.78 (DI)
BH CV ECHO MEAS - ACS: 2.02 CM
BH CV ECHO MEAS - AO MAX PG: 3.6 MMHG
BH CV ECHO MEAS - AO MEAN PG: 2.15 MMHG
BH CV ECHO MEAS - AO ROOT DIAM: 3.7 CM
BH CV ECHO MEAS - AO V2 MAX: 94.7 CM/SEC
BH CV ECHO MEAS - AO V2 VTI: 20.8 CM
BH CV ECHO MEAS - EDV(CUBED): 99.9 ML
BH CV ECHO MEAS - EF(MOD-BP): 61 %
BH CV ECHO MEAS - ESV(CUBED): 29.8 ML
BH CV ECHO MEAS - FS: 33.2 %
BH CV ECHO MEAS - IVS/LVPW: 0.96 CM
BH CV ECHO MEAS - IVSD: 1.16 CM
BH CV ECHO MEAS - LA DIMENSION: 3.6 CM
BH CV ECHO MEAS - LAT PEAK E' VEL: 10 CM/SEC
BH CV ECHO MEAS - LV MASS(C)D: 205.3 GRAMS
BH CV ECHO MEAS - LV MAX PG: 1.97 MMHG
BH CV ECHO MEAS - LV MEAN PG: 0.88 MMHG
BH CV ECHO MEAS - LV V1 MAX: 70.1 CM/SEC
BH CV ECHO MEAS - LV V1 VTI: 16.2 CM
BH CV ECHO MEAS - LVIDD: 4.6 CM
BH CV ECHO MEAS - LVIDS: 3.1 CM
BH CV ECHO MEAS - LVPWD: 1.22 CM
BH CV ECHO MEAS - MED PEAK E' VEL: 7.4 CM/SEC
BH CV ECHO MEAS - MV A MAX VEL: 54 CM/SEC
BH CV ECHO MEAS - MV DEC SLOPE: 244 CM/SEC2
BH CV ECHO MEAS - MV DEC TIME: 0.39 SEC
BH CV ECHO MEAS - MV E MAX VEL: 44.8 CM/SEC
BH CV ECHO MEAS - MV E/A: 0.83
BH CV ECHO MEAS - MV MAX PG: 2.6 MMHG
BH CV ECHO MEAS - MV MEAN PG: 0.99 MMHG
BH CV ECHO MEAS - MV P1/2T: 76.5 MSEC
BH CV ECHO MEAS - MV V2 VTI: 21.8 CM
BH CV ECHO MEAS - MVA(P1/2T): 2.9 CM2
BH CV ECHO MEAS - PA V2 MAX: 102.4 CM/SEC
BH CV ECHO MEAS - PI END-D VEL: 94.7 CM/SEC
BH CV ECHO MEAS - RAP SYSTOLE: 8 MMHG
BH CV ECHO MEAS - RV MAX PG: 1.11 MMHG
BH CV ECHO MEAS - RV V1 MAX: 52.8 CM/SEC
BH CV ECHO MEAS - RV V1 VTI: 11 CM
BH CV ECHO MEAS - RVDD: 3.6 CM
BH CV ECHO MEAS - RVSP: 24.9 MMHG
BH CV ECHO MEAS - TAPSE (>1.6): 1.6 CM
BH CV ECHO MEAS - TR MAX PG: 16.9 MMHG
BH CV ECHO MEAS - TR MAX VEL: 205.8 CM/SEC
BH CV ECHO MEASUREMENTS AVERAGE E/E' RATIO: 5.15
BH CV XLRA - TDI S': 10.5 CM/SEC
SINUS: 3.7 CM

## 2024-07-19 PROCEDURE — 0 TECHNETIUM SESTAMIBI: Performed by: INTERNAL MEDICINE

## 2024-07-19 PROCEDURE — A9500 TC99M SESTAMIBI: HCPCS | Performed by: INTERNAL MEDICINE

## 2024-07-19 PROCEDURE — 78452 HT MUSCLE IMAGE SPECT MULT: CPT

## 2024-07-19 PROCEDURE — 93017 CV STRESS TEST TRACING ONLY: CPT

## 2024-07-19 PROCEDURE — 93306 TTE W/DOPPLER COMPLETE: CPT

## 2024-07-19 RX ADMIN — TECHNETIUM TC 99M SESTAMIBI 1 DOSE: 1 INJECTION INTRAVENOUS at 16:50

## 2024-07-20 LAB
BH CV REST NUCLEAR ISOTOPE DOSE: 10 MCI
BH CV STRESS NUCLEAR ISOTOPE DOSE: 30 MCI
BH CV STRESS RECOVERY BP: NORMAL MMHG
BH CV STRESS RECOVERY HR: 75 BPM
MAXIMAL PREDICTED HEART RATE: 152 BPM
PERCENT MAX PREDICTED HR: 88.16 %
STRESS BASELINE BP: NORMAL MMHG
STRESS BASELINE HR: 60 BPM
STRESS PERCENT HR: 104 %
STRESS POST ESTIMATED WORKLOAD: 10.1 METS
STRESS POST EXERCISE DUR MIN: 7 MIN
STRESS POST EXERCISE DUR SEC: 16 SEC
STRESS POST PEAK BP: NORMAL MMHG
STRESS POST PEAK HR: 134 BPM
STRESS TARGET HR: 129 BPM

## 2024-07-22 ENCOUNTER — TELEPHONE (OUTPATIENT)
Dept: CARDIOLOGY | Facility: CLINIC | Age: 69
End: 2024-07-22
Payer: MEDICARE

## 2024-07-22 NOTE — TELEPHONE ENCOUNTER
Patient notified of result's and recommendation's to keep routine follow up appointment , sooner for symptoms, and of lab result's and recommendation's.

## 2024-07-22 NOTE — TELEPHONE ENCOUNTER
----- Message from Andre Vega sent at 7/22/2024  1:19 PM EDT -----  Routine follow-up, sooner for symptoms.  ----- Message -----  From: Jeff Chavez MD  Sent: 7/20/2024   3:20 PM EDT  To: DONY Setwart    Stress Test With Myocardial Perfusion One Day  Order: [id number]  Status: Final result       Visible to patient: Yes (seen)       Dx: Shortness of breath; Precordial pain;...    0 Result Notes  Details    Reading Physician Reading Date Result Priority   Jeff Chavez MD  [phone] 7/20/2024 Routine     Result Text  1.  Good functional capacity without chest pain.  The patient exercised 7 minutes and 16 seconds on a David protocol to a heart rate of 134, systolic blood pressure of 160, and O2 consumption of 10.1 METS, and to 88% of age-adjusted maximum predicted heart rate.     2.  Physiologic heart rate and blood pressure responses to exercise.     3.  No sustained ectopy or block.  Rare PVCs were noted in recovery.     4.  No scintigraphic evidence of ischemia.     5.  Minimally depressed post stress ejection fraction of 51% with no focal wall motion abnormalities.     6.  Mildly elevated transient ischemic dilation ratio of 1.23.  Multivessel coronary disease cannot be excluded but is felt and unlikely cause of this finding given numbers 1 and 2 above.  No evidence of increased LV filling pressures.        [facility] [address]     Andre Vega PA Worley, Lois J, MA  Glucose at 101, minimally elevated.  Renal function stable for patient.  Chloride elevated at 108, of no significant concern.  Total cholesterol 96, triglycerides 71, HDL diminished at 56, but LDL exceptional at 55.  CBC findings largely benign for patient.  PSA normal.  TSH within normal limits.          Prev[place]

## 2024-08-20 ENCOUNTER — TELEPHONE (OUTPATIENT)
Dept: CARDIOLOGY | Facility: CLINIC | Age: 69
End: 2024-08-20
Payer: MEDICARE

## 2024-08-20 NOTE — TELEPHONE ENCOUNTER
----- Message from Andre Vega sent at 8/19/2024 12:05 AM EDT -----  Low normal but preserved systolic function hypokinesis to akinesis noted in the anterior/anteroseptal segments.  No significant valvular or structural abnormalities otherwise.  Correlate with stress test findings.  If symptomatic, follow-up immediately.  ----- Message -----  From: Jeff Chavez MD  Sent: 8/7/2024   9:20 PM EDT  To: DONY Stewart    Adult Transthoracic Echo Complete W/ Cont if Necessary Per Protocol  Order: [id number]  Status: Final result       Visible to patient: Yes (not seen)       Dx: Shortness of breath; Precordial pain;...    0 Result Notes  Details    Reading Physician Reading Date Result Priority   Jeff Chavez MD  [phone] 8/7/2024 Routine     Result Text       Estimated right ventricular systolic pressure from tricuspid regurgitation is normal (25 mmHg).     Physician interpretation.  Technically adequate study.     1.  LV size is normal and global LV systolic function is in the low normal range.  Visually estimated ejection fraction is 50 to 55%.  There is mid anterior, anteroseptal, and septal hypokinesis to akinesis.  Normal LV wall thickness.  Grade 1 diastolic dysfunction.  Mild left atrial enlargement.  Right heart chambers are normal.  No septal defect or intracavitary mass or thrombus.     2.  Valves are morphologically and physiologically normal with no stenotic lesions or valve associated masses or thrombi.  There is trivial MR and TR.     3.  There is a trivial to small posterolateral pericardial effusion with no evidence of tamponade physiology.  The aortic root is at the upper limits of normal size with no dissection or discrete aneurysmal segment identified.     4.  RV and PA systolic pressures are estimated in the high 20s.        Called patient with result's and recommendation's, patient reports asymptomatic at this time, he is aware if he becomes symptomatic to contact our office for  recommendation's.

## 2024-08-22 NOTE — TELEPHONE ENCOUNTER
Printed echo and stress test findings for Andre haque who recommended cardiac cta if no previous stenting,    Called patient who reports had heart cath with 2 stents in 6/2020. Per Andre haque just monitor, instruct patient to contact office if develops symptoms.     Patient notified to contact our office ASAP if develops any cardiac sympyom's. Verbalizes understanding.

## 2024-09-18 ENCOUNTER — OFFICE VISIT (OUTPATIENT)
Dept: CARDIOLOGY | Facility: CLINIC | Age: 69
End: 2024-09-18
Payer: MEDICARE

## 2024-09-18 VITALS
BODY MASS INDEX: 26.53 KG/M2 | HEART RATE: 74 BPM | OXYGEN SATURATION: 95 % | HEIGHT: 67 IN | DIASTOLIC BLOOD PRESSURE: 74 MMHG | SYSTOLIC BLOOD PRESSURE: 136 MMHG | WEIGHT: 169 LBS

## 2024-09-18 DIAGNOSIS — I25.10 CORONARY ARTERY DISEASE INVOLVING NATIVE CORONARY ARTERY OF NATIVE HEART WITHOUT ANGINA PECTORIS: Primary | ICD-10-CM

## 2024-09-18 DIAGNOSIS — I31.39 PERICARDIAL EFFUSION: ICD-10-CM

## 2024-09-18 DIAGNOSIS — I10 PRIMARY HYPERTENSION: ICD-10-CM

## 2024-09-18 PROCEDURE — 3075F SYST BP GE 130 - 139MM HG: CPT | Performed by: PHYSICIAN ASSISTANT

## 2024-09-18 PROCEDURE — 99214 OFFICE O/P EST MOD 30 MIN: CPT | Performed by: PHYSICIAN ASSISTANT

## 2024-09-18 PROCEDURE — 3078F DIAST BP <80 MM HG: CPT | Performed by: PHYSICIAN ASSISTANT

## 2024-10-17 ENCOUNTER — OFFICE VISIT (OUTPATIENT)
Dept: NEUROSURGERY | Facility: CLINIC | Age: 69
End: 2024-10-17
Payer: MEDICARE

## 2024-10-17 VITALS — TEMPERATURE: 97.7 F | WEIGHT: 172 LBS | BODY MASS INDEX: 27 KG/M2 | HEIGHT: 67 IN

## 2024-10-17 DIAGNOSIS — M54.16 LUMBAR RADICULOPATHY: Primary | ICD-10-CM

## 2024-10-17 DIAGNOSIS — M47.819 FACET ARTHROPATHY: ICD-10-CM

## 2024-10-17 DIAGNOSIS — M51.26 HERNIATED LUMBAR INTERVERTEBRAL DISC: ICD-10-CM

## 2024-10-17 PROCEDURE — 99204 OFFICE O/P NEW MOD 45 MIN: CPT | Performed by: NEUROLOGICAL SURGERY

## 2024-10-17 PROCEDURE — 1160F RVW MEDS BY RX/DR IN RCRD: CPT | Performed by: NEUROLOGICAL SURGERY

## 2024-10-17 PROCEDURE — 1159F MED LIST DOCD IN RCRD: CPT | Performed by: NEUROLOGICAL SURGERY

## 2024-10-17 RX ORDER — ETODOLAC 400 MG
400 TABLET ORAL
COMMUNITY
Start: 2024-09-20

## 2024-10-17 NOTE — PROGRESS NOTES
NAME: GRIFFIN CORTEZ   DOS: 10/17/2024  : 1955  PCP: Nava Schaffer MD    Chief Complaint:    Chief Complaint   Patient presents with    Back Pain    Leg Pain    LBP/BLE    Tingling       History of Present Illness:  69 y.o. male   I saw Griffin Novak in neurosurgical consultation.  He presents he is a retired .  He is a daily smoker.  He presents with a history of axial spinal pain    The pain is mainly in the low back it radiates occasionally into the right leg it is associate with symptoms of dynamic instability that would include standing for protracted periods of time and ambulation rest alleviates the pain    He is here for evaluation with his wife    PMHX  Allergies:  Allergies   Allergen Reactions    Sulfa Antibiotics Nausea Only and Dizziness    Lorcet [Hydrocodone-Acetaminophen] Palpitations     Medications    Current Outpatient Medications:     aspirin 81 MG EC tablet, Take 1 tablet by mouth Daily., Disp: , Rfl:     carvedilol (COREG) 3.125 MG tablet, Take 1 tablet by mouth 2 (Two) Times a Day With Meals., Disp: 180 tablet, Rfl: 1    etodolac (LODINE) 400 MG tablet, 1 tablet., Disp: , Rfl:     rosuvastatin (CRESTOR) 5 MG tablet, Take 1 tablet by mouth Every Night., Disp: , Rfl:   Past Medical History:  Past Medical History:   Diagnosis Date    Abnormal ECG     Acid reflux     Arthritis     Long term    Diverticulitis     Hyperlipidemia     Hypertension     Lumbosacral disc disease 2024    Pain on both sides most of the time especially if I sneeze or cough right leg stays numb left leg partially numb    Myocardial infarction 2020    Stroke 10-    TIA (transient ischemic attack)      Past Surgical History:  Past Surgical History:   Procedure Laterality Date    CAROTID ARTERY - SUBCLAVIAN ARTERY BYPASS GRAFT      4-5 years ago    OTHER SURGICAL HISTORY      partial intestinal removal     Social Hx:  Social History     Tobacco Use    Smoking status: Every Day     Current  packs/day: 0.50     Average packs/day: 0.5 packs/day for 50.0 years (25.0 ttl pk-yrs)     Types: Cigarettes     Passive exposure: Current    Smokeless tobacco: Never   Vaping Use    Vaping status: Never Used   Substance Use Topics    Alcohol use: Never    Drug use: Never     Family Hx:  Family History   Problem Relation Age of Onset    Rheumatic fever Mother     Heart disease Mother     Emphysema Father     Prostate cancer Father     Arthritis Maternal Grandfather         Crippling arthritis     Review of Systems:        Review of Systems   Constitutional:  Negative for activity change, appetite change, chills, diaphoresis, fatigue, fever and unexpected weight change.   HENT:  Negative for congestion, dental problem, drooling, ear discharge, ear pain, facial swelling, hearing loss, mouth sores, nosebleeds, postnasal drip, rhinorrhea, sinus pressure, sinus pain, sneezing, sore throat, tinnitus, trouble swallowing and voice change.    Eyes:  Negative for photophobia, pain, discharge, redness, itching and visual disturbance.   Respiratory:  Negative for apnea, cough, choking, chest tightness, shortness of breath, wheezing and stridor.    Cardiovascular:  Negative for chest pain, palpitations and leg swelling.   Gastrointestinal:  Negative for abdominal distention, abdominal pain, anal bleeding, blood in stool, constipation, diarrhea, nausea, rectal pain and vomiting.   Endocrine: Negative for cold intolerance, heat intolerance, polydipsia, polyphagia and polyuria.   Genitourinary:  Negative for decreased urine volume, difficulty urinating, dysuria, enuresis, flank pain, frequency, genital sores, hematuria, penile discharge, penile pain, penile swelling, scrotal swelling, testicular pain and urgency.   Musculoskeletal:  Positive for back pain. Negative for arthralgias, gait problem, joint swelling, myalgias, neck pain and neck stiffness.   Skin:  Negative for color change, pallor, rash and wound.   Allergic/Immunologic:  Negative for environmental allergies, food allergies and immunocompromised state.   Neurological:  Negative for dizziness, tremors, seizures, syncope, facial asymmetry, speech difficulty, weakness, light-headedness, numbness and headaches.   Hematological:  Negative for adenopathy. Does not bruise/bleed easily.   Psychiatric/Behavioral:  Negative for agitation, behavioral problems, confusion, decreased concentration, dysphoric mood, hallucinations, self-injury, sleep disturbance and suicidal ideas. The patient is not nervous/anxious and is not hyperactive.         I have reviewed this note template and all pertinent parts of the review of systems social, family history, surgical history and medication list  Physical Examination:  There were no vitals filed for this visit.   General Appearance:   Well developed, well nourished, well groomed, alert, and cooperative.  Neurological examination:  Neurological Exam     Vital signs were reviewed and documented in the chart  Patient appeared in good neurologic function with normal comprehension fluent speech  Mood and affect are normal    Muscle bulk and tone normal  5 out of 5 strength no motor drift  Gait normal intact  Negative Romberg  No clonus long tract signs or myelopathy  Arthritic stigmata hands  Reflexes symmetric  No edema noted and extremities skin appears normal    Straight leg raise sign absent  No signs of intrinsic hip dysfunction  Back is without any lesions or abnormality  Feet are warm and well perfused      Review of Imaging/DATA:  Personally reviewed and interpreted a MRI of the lumbar spine that shows a grade 1 spondylolisthesis at L4 on 5 there is high-grade central canal stenosis there is retrolisthesis at 2 3 and 3 4 that contribute to modest degrees of some stenosis.  At the right-sided 4 5.  There may be a ossified synovial cyst  Diagnoses/Plan:    Mr. Sotelo is a 69 y.o. male   1.  Grade 1 spondylolisthesis L4 on 5 it is slight slip with high-grade  central stenosis right-sided likely ossified synovial cyst and neurogenic claudication    2.  Multilevel lumbar degenerative disc disease    3.  Smoking    4.  Deconditioning    I explained the risk benefits and expected outcome of major elective surgery for their problem, complications from approach, and infection, the risk of neurologic implications after surgery as well as need for repeat surgeries and most importantly failure to achieve quality of life improvement from the surgery to the patient.    I explained the importance of smoking cessation to the patient explaining that smoking decreases the efficacy of surgical therapies not to mention the general health risks.  In addition to this when contemplating fusion surgeries smoking decreases fusion rates and leads to poor outcome, and contributes to complication rate.    After extensive discussion and shared decision making with he and his wife plan is    1.  Physical therapy and conditioning    2.  PCP for smoking cessation plan    3.  Follow-up lumbar flexion-extension film we can discuss a lumbar laminectomy, MIS laminectomy or fusion pending the results    4.  Interventional pain management

## 2024-10-23 ENCOUNTER — TELEPHONE (OUTPATIENT)
Dept: CARDIOLOGY | Facility: CLINIC | Age: 69
End: 2024-10-23
Payer: MEDICARE

## 2024-10-23 NOTE — TELEPHONE ENCOUNTER
Patient wanted to know where the closest [facility] imaging center is located. Advised the patient that [place] is closest to us.

## 2024-10-23 NOTE — TELEPHONE ENCOUNTER
Caller: Lyndon Sotelo    Relationship: Self    Best call back number: [phone]    What is the best time to reach you: ANY    Who are you requesting to speak with (clinical staff, provider,  specific staff member): CLINICAL    What was the call regarding: PATIENT CALLED WANTING TO SEE IF LEANDRO HAS ANY RECOMMENDATIONS FOR AN XRAY OF HIS SPINE. HE WANTS TO DO IT WITH [facility] AND IN [place] IF POSSIBLE. PLEASE REACH OUT TO HIM TO DISCUSS. THANK YOU    Is it okay if the provider responds through [facility]: PLEASE CALL

## 2024-10-28 ENCOUNTER — HOSPITAL ENCOUNTER (OUTPATIENT)
Dept: GENERAL RADIOLOGY | Facility: HOSPITAL | Age: 69
Discharge: HOME | End: 2024-10-28
Admitting: NEUROLOGICAL SURGERY
Payer: MEDICARE

## 2024-10-28 ENCOUNTER — OFFICE VISIT (OUTPATIENT)
Dept: PAIN MEDICINE | Facility: CLINIC | Age: 69
End: 2024-10-28
Payer: MEDICARE

## 2024-10-28 VITALS — WEIGHT: 172 LBS | BODY MASS INDEX: 27 KG/M2 | HEIGHT: 67 IN

## 2024-10-28 DIAGNOSIS — M47.819 FACET ARTHROPATHY: ICD-10-CM

## 2024-10-28 DIAGNOSIS — M48.062 SPINAL STENOSIS OF LUMBAR REGION WITH NEUROGENIC CLAUDICATION: Primary | ICD-10-CM

## 2024-10-28 DIAGNOSIS — M51.26 HERNIATED LUMBAR INTERVERTEBRAL DISC: ICD-10-CM

## 2024-10-28 DIAGNOSIS — M54.16 LUMBAR RADICULOPATHY: ICD-10-CM

## 2024-10-28 PROCEDURE — 1159F MED LIST DOCD IN RCRD: CPT | Performed by: STUDENT IN AN ORGANIZED HEALTH CARE EDUCATION/TRAINING PROGRAM

## 2024-10-28 PROCEDURE — 72120 X-RAY BEND ONLY L-S SPINE: CPT

## 2024-10-28 PROCEDURE — 1160F RVW MEDS BY RX/DR IN RCRD: CPT | Performed by: STUDENT IN AN ORGANIZED HEALTH CARE EDUCATION/TRAINING PROGRAM

## 2024-10-28 PROCEDURE — 99204 OFFICE O/P NEW MOD 45 MIN: CPT | Performed by: STUDENT IN AN ORGANIZED HEALTH CARE EDUCATION/TRAINING PROGRAM

## 2024-10-28 PROCEDURE — G2211 COMPLEX E/M VISIT ADD ON: HCPCS | Performed by: STUDENT IN AN ORGANIZED HEALTH CARE EDUCATION/TRAINING PROGRAM

## 2024-10-28 PROCEDURE — 1125F AMNT PAIN NOTED PAIN PRSNT: CPT | Performed by: STUDENT IN AN ORGANIZED HEALTH CARE EDUCATION/TRAINING PROGRAM

## 2024-10-28 NOTE — PROGRESS NOTES
Referring Physician: Herbie Ingram MD  [address]  [place] [address]    Primary Physician: Nava Schaffer MD    CHIEF COMPLAINT or REASON FOR VISIT: Back Pain (New patient)      Initial history of present illness on 10/28/2024:  Mr. Lyndon Sotelo is 69 y.o. male who presents as a new patient referral for evaluation treatment of chronic low back pain with radiation to the right greater than left lower extremities.  Pain is exacerbated with prolonged standing and ambulation.  Ameliorated with rest.  He describes an approximately 1 year history of symptoms which have been worsening over the past month or so.  He has tried physical therapy with modest benefit.  He has been evaluated by neurosurgery, Dr. Heribe Ingram MD, who referred for interventional pain management.  They did discuss a laminectomy and interbody fusion.  Patient denies any bowel or bladder dysfunction, lower extremity weakness, new onset saddle anesthesia or unexplained weight loss.  He has tried medications including acetaminophen, tizanidine, NSAIDs.      Interval history:    Interventions:      Objective Pain Scoring:   BRIEF PAIN INVENTORY:  Total score:   Pain Score    10/28/24 0927   PainSc:   5   PainLoc: Back      PHQ-2: 4  PHQ-9: 12  Opioid Risk Tool:         Review of Systems:   ROS negative except as otherwise noted     Past Medical History:   Past Medical History:   Diagnosis Date    Abnormal ECG     Acid reflux     Arthritis     Long term    Diverticulitis     Fibromyalgia, primary 1998    Hyperlipidemia     Hypertension     Joint pain 2000    Lumbosacral disc disease 07/01/2024    Pain on both sides most of the time especially if I sneeze or cough right leg stays numb left leg partially numb    Myocardial infarction 07-    Osteoarthritis 2000    Stroke 10-    TIA (transient ischemic attack)          Past Surgical History:   Past Surgical History:   Procedure Laterality Date    CAROTID  "ARTERY - SUBCLAVIAN ARTERY BYPASS GRAFT      4-5 years ago    OTHER SURGICAL HISTORY      partial intestinal removal         Family History   Family History   Problem Relation Age of Onset    Rheumatic fever Mother     Heart disease Mother     Emphysema Father     Prostate cancer Father     COPD Father     Arthritis Maternal Grandfather         Crippling arthritis         Social History   Social History     Socioeconomic History    Marital status:    Tobacco Use    Smoking status: Every Day     Current packs/day: 0.50     Average packs/day: 0.5 packs/day for 102.8 years (51.4 ttl pk-yrs)     Types: Cigarettes     Start date: 1/1/1972     Passive exposure: Current    Smokeless tobacco: Never    Tobacco comments:     Trying to quit   Vaping Use    Vaping status: Never Used   Substance and Sexual Activity    Alcohol use: Never    Drug use: Never    Sexual activity: Not Currently     Partners: Female     Birth control/protection: None        Medications:     Current Outpatient Medications:     aspirin 81 MG EC tablet, Take 1 tablet by mouth Daily., Disp: , Rfl:     carvedilol (COREG) 3.125 MG tablet, Take 1 tablet by mouth 2 (Two) Times a Day With Meals., Disp: 180 tablet, Rfl: 1    etodolac (LODINE) 400 MG tablet, 1 tablet., Disp: , Rfl:     rosuvastatin (CRESTOR) 5 MG tablet, Take 1 tablet by mouth Every Night., Disp: , Rfl:     tiZANidine (ZANAFLEX) 4 MG tablet, , Disp: , Rfl:         Physical Exam:     Vitals:    10/28/24 0927   Weight: 78 kg (172 lb)   Height: 170.2 cm (67\")   PainSc:   5   PainLoc: Back        General: Alert and oriented, No acute distress.   HEENT: Normocephalic, atraumatic.   Cardiovascular: No gross edema  Respiratory: Respirations are non-labored    Lumbar Spine:   No masses or atrophy  Range of motion - Flexion normal. Extension reduced.    Facet Loading: Positive bilaterally  Facet Palpation -tender  Jacob finger/Gaenslen's/Fred's/JESSICA/Thigh thrust -   Straight leg raise/slump " test: Positive right  Multifidus toe-touch test:    Motor Exam:       Strength: Rate on 1-5 scale Right Left    L1/2- hip flexion 5/5  5/5    L3- knee extension 5/5  5/5    L4- ankle dorsiflexion 5/5  5/5    L5- great toe extension 5/5  5/5    S1- ankle plantarflexion 5/5  5/5    Sensory Exam: Numbness and tingling right lower extremity       Neurologic: Cranial Nerves II-XII are grossly intact.      Psychiatric: Cooperative.   Gait: Antalgic flexed forward  Assistive Devices: None    Imaging Studies:   No results found for this or any previous visit.        Independent review of radiographic imaging: Available for my interpretation is lumbar MRI dated September 25, 2024 demonstrating grade 1 (5 mm) anterolisthesis of L4 on L5 with ligamentum flavum hypertrophy causing moderate canal stenosis; bilateral neuroforaminal stenosis at L3/L4 and L4/L5; levoscoliosis; facet hypertrophy with right posterior L4/L5 facet cyst; multifidus atrophy.    Impression & Plan:       10/28/2024: Lyndon Sotelo is a 69 y.o. male with past medical history significant for HTN, HLD, GERD, TIA, who presents to the pain clinic for evaluation and treatment of chronic low back pain with radiation bilateral lower extremity.  MRI interpretation as above.  Evaluation consistent with lumbar spinal stenosis with neurogenic claudication.  We discussed epidural steroid injection to improve pain.  If greater than 50% relief for at least 2-3 months can consider repeat as needed every 3 to 4 months.  I had a discussion with the patient regarding the risks of the procedure including bleeding, infection, damage to surrounding structures.  We discussed the potential adverse effects of corticosteroid injection including flushing of the face, lipodystrophy, skin discoloration, elevated blood glucose, increased blood pressure.  Risks of frequent steroid administration include weight gain, hormonal changes, mood changes, osteoporosis.  Pending lumbar flex ex  radiographs.  If minimal only transient benefit from epidural steroid injection can consider minimally invasive posterolateral arthrodesis at L4/L5.    1. Spinal stenosis of lumbar region with neurogenic claudication        PLAN:  1. Medication Recommendations: Recommend Voltaren topical, NSAIDs, Tylenol.  Can trial turmeric 500 mg twice daily if NSAID contraindicated.    2. Physical Therapy: Continue PT    3. Psychological: defer    4. Complementary and alternative (CAM) Therapies:     5. Labs/Diagnostic studies: None indicated     6. Imaging: MRI independently interpreted and reviewed with patient    7. Interventions: Schedule lumbar interlaminar epidural steroid injection (74558).  Will likely enter at L3/L4.  Consider L4/L5 minimally invasive posterolateral arthrodesis ([id number], [id number], [id number]).    8. Referrals: None indicated     9. Records: Neurosurgery notes reviewed    10. Lifestyle goals:    Follow-up 1 month      [facility]  Bob Gardiner MD          Quality Metrics:

## 2024-10-29 ENCOUNTER — TELEPHONE (OUTPATIENT)
Dept: PAIN MEDICINE | Facility: CLINIC | Age: 69
End: 2024-10-29
Payer: MEDICARE

## 2024-10-29 NOTE — TELEPHONE ENCOUNTER
Called patient back and informed him that Dr. Gardiner procedure days are Tuesday and Thursday and he would not be able to change his procedure date to 11/11/24.     No further questions, closing TE.

## 2024-10-29 NOTE — TELEPHONE ENCOUNTER
Provider:     Caller: PUSHPA CORTEZ    Relationship to Patient: PATIENT    Pharmacy: NA    Phone Number: [phone]    Reason for Call: TO R/S PROCEDURE TO 11.11.24

## 2024-10-31 ENCOUNTER — PATIENT ROUNDING (BHMG ONLY) (OUTPATIENT)
Dept: PAIN MEDICINE | Facility: CLINIC | Age: 69
End: 2024-10-31
Payer: MEDICARE

## 2024-10-31 NOTE — PROGRESS NOTES
A MY-CHART MESSAGE HAS BEEN SENT TO THE PATIENT FOR PATIENT ROUNDING WITH [facility].

## 2024-11-12 ENCOUNTER — DOCUMENTATION (OUTPATIENT)
Dept: PAIN MEDICINE | Facility: CLINIC | Age: 69
End: 2024-11-12

## 2024-11-12 ENCOUNTER — OUTSIDE FACILITY SERVICE (OUTPATIENT)
Dept: PAIN MEDICINE | Facility: CLINIC | Age: 69
End: 2024-11-12
Payer: MEDICARE

## 2024-11-12 NOTE — PROGRESS NOTES
[facility]  [address] [place] [address]      PROCEDURE: Fluoroscopically-guided Lumbar Interlaminar Epidural Steroid Injection     PRE-OP DIAGNOSIS: Lumbar spinal stenosis with neurogenic claudication  POST-OP DIAGNOSIS: Same    BLOOD THINNERS (ANTIPLATELETS/ANTICOAGULANTS): Were discussed with the patient and JORDAN Guidelines were followed.     CONSENT: Risks, benefits and options were explained to the patient, all questions were answered and written informed consent was obtained.     ANESTHESIA: Local Only     PROCEDURE NOTE: A pre-procedural time out was performed to confirm the correct patient, procedure, side, and site. Standard ASA monitors were applied and oxygen via nasal cannula was provided. All proceduralists donned sterile gloves with masks and surgical hats. The patient was placed prone with pillow under the abdomen and all pressure points padded. The patient's lumbar spine was prepped in standard fashion using [Chlorhexidine] and draped with sterile towels. The target lumbar interspace was identified using fluoroscopy. The overlying skin and subcutaneous tissue was anesthetized with 1% lidocaine. A 20 gauge 10 cm Tuohy needle was advanced using intermittent AP and lateral fluoroscopy. The ligamentum flavum was engaged. Access to the epidural space was gained using a loss of resistance technique to air. Needle placement was confirmed with 1 ml of Omnipaque 180 mgI/ml contrast using biplanar live fluoroscopic imaging. An epidurogram was noted without evidence of intravascular or intrathecal spread. After negative aspiration, a mixture containing 3 ml of preservative-free normal saline, dexamethasone 10mg steroid, lidocaine 1% - 2 ml local anesthetic for a total volume of 6 ml was injected under direct visualization with fluoroscopy. The Tuohy needle was flushed, removed and a bandage applied.     EBL: None     COMPLICATIONS: None     The patient tolerated  the procedure well. Vital signs were stable. Sensory and motor exam was unchanged from baseline. The patient was observed in recovery and was discharged after meeting established criteria.    FOLLOW UP: As scheduled     ADDITIONAL NOTES: Patient had an episode of bradycardia at the conclusion of the procedure. He was asymptomatic. 50mg IM Ephedrine was administered and patient remained under monitoring for 1 hour in PACU. Bradycardia / vagal episode resolved.     [facility]   Bob Gardiner MD      CODES:  [id number]

## 2024-12-02 ENCOUNTER — TELEPHONE (OUTPATIENT)
Dept: PAIN MEDICINE | Facility: CLINIC | Age: 69
End: 2024-12-02
Payer: MEDICARE

## 2024-12-02 NOTE — TELEPHONE ENCOUNTER
Patient called and wanted to make Dr. Gardiner aware of his pain after his procedure.     I spoke with the patient regarding how he is feeling after his procedure with Dr Gardiner on 11/12/24. Patient reports that he is still having pain.     Lyndon Sotelo underwent a lumbar interlaminar epidural steroid injection  on 11/12/24. Patient reported 70% relief at the time of after procedure exam with Dr Gardiner for 3 days the week after the procedure. In addition, patient experienced significant functional improvement Friday 11/29/24(perforing activities without experiencing pain compared with examoination prior to procedure that produced these activities.) However, that only lasted a day.     Pain level before procedure: /10  Pain level after procedure: 3/10    Today, the patient reports 40% pain relief (pain level 6/10.) The location of pain changes from his RT leg, lower back, gabino legs, across his feet, and RT calf.     Patient reports today that he did participate in PT before his procedure but continues to do home exercises but they seem to aggravate his pain. Patient reached out to his PCP who prescribed him Tramadol but stated it makes him sick.     Patient is scheduled with DONY Barragan on 12/11/24

## 2024-12-03 NOTE — TELEPHONE ENCOUNTER
Called patient and advised him that his current pain issues will be further discussed at his follow up appoint with DONY Barragan on 12/11/24.    Patient was understanding and had no further questions.

## 2024-12-11 ENCOUNTER — OFFICE VISIT (OUTPATIENT)
Dept: PAIN MEDICINE | Facility: CLINIC | Age: 69
End: 2024-12-11
Payer: MEDICARE

## 2024-12-11 VITALS — WEIGHT: 174 LBS | HEIGHT: 67 IN | BODY MASS INDEX: 27.31 KG/M2

## 2024-12-11 DIAGNOSIS — M48.062 SPINAL STENOSIS OF LUMBAR REGION WITH NEUROGENIC CLAUDICATION: Primary | ICD-10-CM

## 2024-12-11 DIAGNOSIS — M54.16 LUMBAR RADICULOPATHY: ICD-10-CM

## 2024-12-11 RX ORDER — MELOXICAM 15 MG/1
15 TABLET ORAL DAILY
COMMUNITY
Start: 2024-11-19

## 2024-12-11 NOTE — PROGRESS NOTES
Referring Physician: No referring provider defined for this encounter.    Primary Physician: Nava Schaffer MD    CHIEF COMPLAINT or REASON FOR VISIT: Follow-up (Post LESI) and Back Pain      Initial history of present illness on 10/28/2024:  Mr. Lyndon Sotelo is 69 y.o. male who presents as a new patient referral for evaluation treatment of chronic low back pain with radiation to the right greater than left lower extremities.  Pain is exacerbated with prolonged standing and ambulation.  Ameliorated with rest.  He describes an approximately 1 year history of symptoms which have been worsening over the past month or so.  He has tried physical therapy with modest benefit.  He has been evaluated by neurosurgery, Dr. Herbie Ingram MD, who referred for interventional pain management.  They did discuss a laminectomy and interbody fusion.  Patient denies any bowel or bladder dysfunction, lower extremity weakness, new onset saddle anesthesia or unexplained weight loss.  He has tried medications including acetaminophen, tizanidine, NSAIDs.      Interval history:  Patient returns to clinic today after undergoing a lumbar interlaminar epidural steroid injection.  Additionally, he did undergo a lumbar radiograph with flexion and extension that revealed 2 mm of grade 1 anterolisthesis of L4 on L5 on extension which increases to 4 mm on flexion.   Today, he reports good relief from this procedure for approximately 3 days.  There are times in which his pain will radiate down into the bilateral buttocks as well as his right lower extremity.  Pain will radiate along the lateral/posterior aspect of his right leg extending all the way into his foot.  There is associated numbness and tingling within his right foot and lateral aspect of his right lower leg.  He has discussed potential surgical options with Dr. Ingram and has a follow-up appointment with him on 1/16/2025 to discuss further surgical options.  He is interested in  strategies to help alleviate his pain.  His pain is exacerbated with ambulation as well as abdominal pressure.  He does not find that forward flexion helps alleviate his pain.  He has a smoker and has cut down some since his last office visit but has not completely stopped.    Interventions:  11/12/2024: LESI with 75% relief for 3 days    Objective Pain Scoring:   BRIEF PAIN INVENTORY:  Total score:   Pain Score    12/11/24 1120   PainSc:   6   PainLoc: Back        PHQ-2: 1  PHQ-9:    Opioid Risk Tool:         Review of Systems:   ROS negative except as otherwise noted     Past Medical History:   Past Medical History:   Diagnosis Date    Abnormal ECG     Acid reflux     Arthritis     Long term    Diverticulitis     Fibromyalgia, primary 1998    Hyperlipidemia     Hypertension     Joint pain 2000    Lumbosacral disc disease 07/01/2024    Pain on both sides most of the time especially if I sneeze or cough right leg stays numb left leg partially numb    Myocardial infarction 07-    Osteoarthritis 2000    Stroke 10-    TIA (transient ischemic attack)          Past Surgical History:   Past Surgical History:   Procedure Laterality Date    CAROTID ARTERY - SUBCLAVIAN ARTERY BYPASS GRAFT      4-5 years ago    OTHER SURGICAL HISTORY      partial intestinal removal         Family History   Family History   Problem Relation Age of Onset    Rheumatic fever Mother     Heart disease Mother     Emphysema Father     Prostate cancer Father     COPD Father     Arthritis Maternal Grandfather         Crippling arthritis         Social History   Social History     Socioeconomic History    Marital status:    Tobacco Use    Smoking status: Every Day     Current packs/day: 0.50     Average packs/day: 0.5 packs/day for 102.9 years (51.5 ttl pk-yrs)     Types: Cigarettes     Start date: 1/1/1972     Passive exposure: Current    Smokeless tobacco: Never    Tobacco comments:     Trying to quit   Vaping Use    Vaping  "status: Never Used   Substance and Sexual Activity    Alcohol use: Never    Drug use: Never    Sexual activity: Not Currently     Partners: Female     Birth control/protection: None        Medications:     Current Outpatient Medications:     aspirin 81 MG EC tablet, Take 1 tablet by mouth Daily., Disp: , Rfl:     carvedilol (COREG) 3.125 MG tablet, Take 1 tablet by mouth 2 (Two) Times a Day With Meals., Disp: 180 tablet, Rfl: 1    etodolac (LODINE) 400 MG tablet, 1 tablet., Disp: , Rfl:     meloxicam (MOBIC) 15 MG tablet, Take 1 tablet by mouth Daily., Disp: , Rfl:     rosuvastatin (CRESTOR) 5 MG tablet, Take 1 tablet by mouth Every Night., Disp: , Rfl:     tiZANidine (ZANAFLEX) 4 MG tablet, , Disp: , Rfl:         Physical Exam:     Vitals:    12/11/24 1120   Weight: 78.9 kg (174 lb)   Height: 170.2 cm (67.01\")   PainSc:   6   PainLoc: Back        General: Alert and oriented, No acute distress.   HEENT: Normocephalic, atraumatic.   Cardiovascular: No gross edema  Respiratory: Respirations are non-labored    Lumbar Spine:   No masses or atrophy  Range of motion - Flexion normal. Extension reduced.    Facet Loading: Positive bilaterally  Facet Palpation -tender  Jacob finger/Gaenslen's/Fred's/JESSICA/Thigh thrust -   Straight leg raise/slump test: Positive right  Multifidus toe-touch test:    Motor Exam:       Strength: Rate on 1-5 scale Right Left    L1/2- hip flexion 5/5  5/5    L3- knee extension 5/5  5/5    L4- ankle dorsiflexion 5/5  5/5    L5- great toe extension 5/5  5/5    S1- ankle plantarflexion 5/5  5/5    Sensory Exam: Numbness and tingling right lower extremity       Neurologic: Cranial Nerves II-XII are grossly intact.      Psychiatric: Cooperative.   Gait: Antalgic flexed forward  Assistive Devices: None    Imaging Studies:   No results found for this or any previous visit.        Independent review of radiographic imaging: Available for my interpretation is lumbar MRI dated September 25, 2024 " demonstrating grade 1 (5 mm) anterolisthesis of L4 on L5 with ligamentum flavum hypertrophy causing moderate canal stenosis; bilateral neuroforaminal stenosis at L3/L4 and L4/L5; levoscoliosis; facet hypertrophy with right posterior L4/L5 facet cyst; multifidus atrophy.    Impression & Plan:       10/28/2024: Lyndon Sotelo is a 69 y.o. male with past medical history significant for HTN, HLD, GERD, TIA, who presents to the pain clinic for evaluation and treatment of chronic low back pain with radiation bilateral lower extremity.  MRI interpretation as above.  Evaluation consistent with lumbar spinal stenosis with neurogenic claudication.  We discussed epidural steroid injection to improve pain.  If greater than 50% relief for at least 2-3 months can consider repeat as needed every 3 to 4 months.  I had a discussion with the patient regarding the risks of the procedure including bleeding, infection, damage to surrounding structures.  We discussed the potential adverse effects of corticosteroid injection including flushing of the face, lipodystrophy, skin discoloration, elevated blood glucose, increased blood pressure.  Risks of frequent steroid administration include weight gain, hormonal changes, mood changes, osteoporosis.  Pending lumbar flex ex radiographs.  If minimal only transient benefit from epidural steroid injection can consider minimally invasive posterolateral arthrodesis at L4/L5.  12/11/2024: Good but transient relief from LESI.  Will plan for right-sided L4/5 and L5/S1 TFESI.  May consider L4/5 posterior lateral arthrodesis if no surgical intervention per NSA.    1. Spinal stenosis of lumbar region with neurogenic claudication    2. Lumbar radiculopathy          PLAN:  1. Medication Recommendations: Recommend Voltaren topical, NSAIDs, Tylenol.  Can trial turmeric 500 mg twice daily if NSAID contraindicated.    2. Physical Therapy: Continue PT    3. Psychological: defer    4. Complementary and alternative  (CAM) Therapies:     5. Labs/Diagnostic studies: None indicated     6. Imaging: Lumbar radiograph report reviewed    7. Interventions: Schedule right L4/5 and L5/S1 transforaminal epidural steroid injection (22261, [id number]).  He may hold his aspirin 7 days prior to procedure. We discussed epidural steroid injection to improve pain.  If greater than 50% relief for at least 2 to 3 months can consider repeat as needed every 3 to 4 months.  Had a discussion with the patient regarding the risk of the procedure including bleeding, infection, damage to surrounding structures.  We discussed the potential adverse effects of corticosteroid injection including flushing of the face, lipodystrophy, skin discoloration, elevated blood glucose, increased blood pressure.  Risks of frequent steroid administration includes weight gain, hormonal changes, mood changes, osteoporosis.   Would most likely proceed with L4/L5 minimally invasive posterolateral arthrodesis (35614, [id number], [id number]) if no current neurosurgical interventions    8. Referrals: None indicated     9. Records: Neurosurgery notes reviewed    10. Lifestyle goals:    Follow-up on 1/16/2025; sooner if clinically dictated      [facility]  Анна Randle PA-C          Quality Metrics:

## 2024-12-26 ENCOUNTER — TELEPHONE (OUTPATIENT)
Dept: PAIN MEDICINE | Facility: CLINIC | Age: 69
End: 2024-12-26
Payer: MEDICARE

## 2024-12-26 NOTE — TELEPHONE ENCOUNTER
Surgery/Procedure:  right L4/5 and L5/S1 transforaminal epidural steroid injection   Surgery/Procedure Date:  12/31/2024  Last visit:   Office Visit with Анна Randle PA-C (12/11/2024)   Next visit: 01/16/2025     Reason for call:    Patient wanted to leave a message for Dr. Gardiner stating that he is scheduled for a TFESI on 12/31/2024. He reports that has pain has decreased dramatically. He has some discomfort in his lower back and sometimes in his legs. He would like to know if Dr. Gardiner still suggest that he get the injection?

## 2024-12-30 NOTE — TELEPHONE ENCOUNTER
Attempted to contact patient to relay Kevin's message. No answer. Left detailed message relaying Kevin's message and to call us with any questions or concerns. Provided a call  back number.

## 2024-12-31 ENCOUNTER — OUTSIDE FACILITY SERVICE (OUTPATIENT)
Dept: PAIN MEDICINE | Facility: CLINIC | Age: 69
End: 2024-12-31
Payer: MEDICARE

## 2024-12-31 ENCOUNTER — DOCUMENTATION (OUTPATIENT)
Dept: PAIN MEDICINE | Facility: CLINIC | Age: 69
End: 2024-12-31

## 2024-12-31 NOTE — PROGRESS NOTES
[facility]  [address] [place] [address]      PROCEDURE: Fluoroscopically-guided  Lumbar Transforaminal Epidural Steroid Injection - right L4/5 and L5/s1    PRE-OP DIAGNOSIS: Lumbar radiculopathy  POST-OP DIAGNOSIS: Lumbar radiculopathy    BLOOD THINNERS (ANTIPLATELETS/ANTICOAGULANTS): Were discussed with the patient and JORDAN Guidelines were followed.     CONSENT: Risks, benefits and options were explained to the patient, all questions were answered and written informed consent was obtained.     ANESTHESIA:  Local only    PROCEDURE NOTE: A pre-procedural time out was performed to confirm the correct patient, procedure, side, and site. Standard ASA monitors were applied and oxygen via nasal cannula was provided. All proceduralists donned sterile gloves with masks and surgical hats. The patient was placed prone with pillow under the abdomen and all pressure points padded. The patient's lumbar spine was prepped in standard fashion using Chlorhexidine and draped with sterile towels. The target neuroforamen was identified using oblique fluoroscopy and the superior vertebral body endplate squared. The overlying skin and subcutaneous tissue was anesthetized with 1% lidocaine. A 22 gauge 3.5 inch spinal needle with bent tip was incrementally advanced using intermittent fluoroscopy to the 6 o'clock position of the target pedicle in the mid-neuroforamen using oblique, AP and lateral intermittent fluoroscopy. After negative aspiration of blood and cerebrospinal fluid, needle placement was confirmed with 1 ml of omnipaque 180 mgI/ml contrast using AP fluoroscopic imaging. Imaging revealed a clear outline of the target spinal nerve with proximal spread of agent through the neuroforamen medially to the epidural space, without evidence of intravascular or intrathecal spread. After negative aspiration, a mixture containing dexamethasone 5 mg steroid and lidocaine 1% - 1 ml local  anesthetic for a total volume of 1.5 ml was injected under direct visualization with fluoroscopy. The needle was flushed, removed and a bandage applied.  The same procedure utilizing the same technique was performed at each target level listed above.    EBL: None     COMPLICATIONS: None     The patient was monitored in recovery area until all discharge criteria were met. Vital signs remained stable throughout the procedure and in the recovery area. There were no immediate complications and the patient tolerated the procedure well. Sensory and motor exam was unchanged from baseline. The patient received written discharge instructions prior to discharge.     FOLLOW UP: As scheduled     ADDITIONAL NOTES: []        [facility]       Bob Gardiner MD     CODES:  [id number]  [id number]

## 2025-01-16 ENCOUNTER — OFFICE VISIT (OUTPATIENT)
Dept: NEUROSURGERY | Facility: CLINIC | Age: 70
End: 2025-01-16
Payer: MEDICARE

## 2025-01-16 ENCOUNTER — OFFICE VISIT (OUTPATIENT)
Dept: PAIN MEDICINE | Facility: CLINIC | Age: 70
End: 2025-01-16
Payer: MEDICARE

## 2025-01-16 VITALS — HEIGHT: 67 IN | TEMPERATURE: 98 F | BODY MASS INDEX: 27.47 KG/M2 | WEIGHT: 175 LBS

## 2025-01-16 VITALS — BODY MASS INDEX: 27.47 KG/M2 | HEIGHT: 67 IN | WEIGHT: 175 LBS

## 2025-01-16 DIAGNOSIS — M48.062 SPINAL STENOSIS OF LUMBAR REGION WITH NEUROGENIC CLAUDICATION: ICD-10-CM

## 2025-01-16 DIAGNOSIS — M51.26 HERNIATED LUMBAR INTERVERTEBRAL DISC: ICD-10-CM

## 2025-01-16 DIAGNOSIS — M54.16 LUMBAR RADICULOPATHY: Primary | ICD-10-CM

## 2025-01-16 DIAGNOSIS — M47.819 FACET ARTHROPATHY: ICD-10-CM

## 2025-01-16 PROCEDURE — 99214 OFFICE O/P EST MOD 30 MIN: CPT | Performed by: NEUROLOGICAL SURGERY

## 2025-01-16 NOTE — PROGRESS NOTES
Referring Physician: No referring provider defined for this encounter.    Primary Physician: Nava Schaffer MD    CHIEF COMPLAINT or REASON FOR VISIT: Follow-up (Post TFESI) and Back Pain      Initial history of present illness on 10/28/2024:  Mr. Lyndon Sotelo is 69 y.o. male who presents as a new patient referral for evaluation treatment of chronic low back pain with radiation to the right greater than left lower extremities.  Pain is exacerbated with prolonged standing and ambulation.  Ameliorated with rest.  He describes an approximately 1 year history of symptoms which have been worsening over the past month or so.  He has tried physical therapy with modest benefit.  He has been evaluated by neurosurgery, Dr. Herbie Ingram MD, who referred for interventional pain management.  They did discuss a laminectomy and interbody fusion.  Patient denies any bowel or bladder dysfunction, lower extremity weakness, new onset saddle anesthesia or unexplained weight loss.  He has tried medications including acetaminophen, tizanidine, NSAIDs.      Interval history:  Patient returns to clinic today after undergoing a right-sided transforaminal epidural steroid injection.  He reports excellent relief from this procedure.  He is essentially no pain on a day-to-day basis.  There has been a significant improvement in his symptoms.  He is even able to do all the work around his house without pain.  He would be interested in repeat injections if necessary.  He was evaluated by neurosurgery, Dr. Herbie Ingarm MD, related to discuss potential laminectomy at L4-5 if his symptoms return.    Interventions:  11/12/2024: LESI with 75% relief for 3 days  12/31/2024: Right L4/5 and L5/S1 TFESI with 100% pain relief    Objective Pain Scoring:   BRIEF PAIN INVENTORY:  Total score:   Pain Score    01/16/25 1129   PainSc: 0-No pain   PainLoc: Back        PHQ-2: 0  PHQ-9:    Opioid Risk Tool:         Review of Systems:   ROS negative  except as otherwise noted     Past Medical History:   Past Medical History:   Diagnosis Date    Abnormal ECG     Acid reflux     Arthritis     Long term    Diverticulitis     Fibromyalgia, primary 1998    Hyperlipidemia     Hypertension     Joint pain 2000    Lumbosacral disc disease 07/01/2024    Pain on both sides most of the time especially if I sneeze or cough right leg stays numb left leg partially numb    Myocardial infarction 07-    Osteoarthritis 2000    Stroke 10-    TIA (transient ischemic attack)          Past Surgical History:   Past Surgical History:   Procedure Laterality Date    CAROTID ARTERY - SUBCLAVIAN ARTERY BYPASS GRAFT      4-5 years ago    OTHER SURGICAL HISTORY      partial intestinal removal         Family History   Family History   Problem Relation Age of Onset    Rheumatic fever Mother     Heart disease Mother     Emphysema Father     Prostate cancer Father     COPD Father     Arthritis Maternal Grandfather         Crippling arthritis         Social History   Social History     Socioeconomic History    Marital status:    Tobacco Use    Smoking status: Every Day     Current packs/day: 0.50     Average packs/day: 0.5 packs/day for 103.0 years (51.5 ttl pk-yrs)     Types: Cigarettes     Start date: 1/1/1972     Passive exposure: Current    Smokeless tobacco: Never    Tobacco comments:     Trying to quit   Vaping Use    Vaping status: Never Used   Substance and Sexual Activity    Alcohol use: Never    Drug use: Never    Sexual activity: Not Currently     Partners: Female     Birth control/protection: None        Medications:     Current Outpatient Medications:     aspirin 81 MG EC tablet, Take 1 tablet by mouth Daily., Disp: , Rfl:     carvedilol (COREG) 3.125 MG tablet, Take 1 tablet by mouth 2 (Two) Times a Day With Meals., Disp: 180 tablet, Rfl: 1    etodolac (LODINE) 400 MG tablet, 1 tablet., Disp: , Rfl:     meloxicam (MOBIC) 15 MG tablet, Take 1 tablet by mouth  "Daily., Disp: , Rfl:     rosuvastatin (CRESTOR) 5 MG tablet, Take 1 tablet by mouth Every Night., Disp: , Rfl:     tiZANidine (ZANAFLEX) 4 MG tablet, , Disp: , Rfl:         Physical Exam:     Vitals:    01/16/25 1129   Weight: 79.4 kg (175 lb)   Height: 170.2 cm (67.01\")   PainSc: 0-No pain   PainLoc: Back        General: Alert and oriented, No acute distress.   HEENT: Normocephalic, atraumatic.   Cardiovascular: No gross edema  Respiratory: Respirations are non-labored    Lumbar Spine:   No masses or atrophy  Range of motion - Flexion normal. Extension reduced.    Facet Loading: Positive bilaterally  Facet Palpation -tender  Jacob finger/Gaenslen's/Fred's/JESSICA/Thigh thrust -   Straight leg raise/slump test: Positive right  Multifidus toe-touch test:    Motor Exam:       Strength: Rate on 1-5 scale Right Left    L1/2- hip flexion 5/5  5/5    L3- knee extension 5/5  5/5    L4- ankle dorsiflexion 5/5  5/5    L5- great toe extension 5/5  5/5    S1- ankle plantarflexion 5/5  5/5    Sensory Exam: Numbness and tingling right lower extremity       Neurologic: Cranial Nerves II-XII are grossly intact.      Psychiatric: Cooperative.   Gait: Antalgic flexed forward  Assistive Devices: None    Imaging Studies:   No results found for this or any previous visit.        Independent review of radiographic imaging: Available for my interpretation is lumbar MRI dated September 25, 2024 demonstrating grade 1 (5 mm) anterolisthesis of L4 on L5 with ligamentum flavum hypertrophy causing moderate canal stenosis; bilateral neuroforaminal stenosis at L3/L4 and L4/L5; levoscoliosis; facet hypertrophy with right posterior L4/L5 facet cyst; multifidus atrophy.    Impression & Plan:       10/28/2024: Lyndon Sotelo is a 69 y.o. male with past medical history significant for HTN, HLD, GERD, TIA, who presents to the pain clinic for evaluation and treatment of chronic low back pain with radiation bilateral lower extremity.  MRI " interpretation as above.  Evaluation consistent with lumbar spinal stenosis with neurogenic claudication.  We discussed epidural steroid injection to improve pain.  If greater than 50% relief for at least 2-3 months can consider repeat as needed every 3 to 4 months.  I had a discussion with the patient regarding the risks of the procedure including bleeding, infection, damage to surrounding structures.  We discussed the potential adverse effects of corticosteroid injection including flushing of the face, lipodystrophy, skin discoloration, elevated blood glucose, increased blood pressure.  Risks of frequent steroid administration include weight gain, hormonal changes, mood changes, osteoporosis.  Pending lumbar flex ex radiographs.  If minimal only transient benefit from epidural steroid injection can consider minimally invasive posterolateral arthrodesis at L4/L5.  12/11/2024: Good but transient relief from LESI.  Will plan for right-sided L4/5 and L5/S1 TFESI.  May consider L4/5 posterior lateral arthrodesis if no surgical intervention per NSA.  1/16/2025: Excellent relief from transforaminal approach.  Can consider repeat if needed.  May consider L4/5 posterior lateral interspinous arthrodesis.    1. Lumbar radiculopathy    2. Spinal stenosis of lumbar region with neurogenic claudication            PLAN:  1. Medication Recommendations: Recommend Voltaren topical, NSAIDs, Tylenol.  Can trial turmeric 500 mg twice daily if NSAID contraindicated.    2. Physical Therapy: Continue PT    3. Psychological: defer    4. Complementary and alternative (CAM) Therapies:     5. Labs/Diagnostic studies: None indicated     6. Imaging: Lumbar radiograph report reviewed    7. Interventions: Can consider repeat right L4/5 and L5/S1 TFESI.  Would most likely proceed with L4/L5 minimally invasive posterolateral arthrodesis ([id number], [id number], [id number]) if no current neurosurgical interventions and minimal relief to repeat injections    8.  Referrals: None indicated     9. Records: Neurosurgery notes reviewed    10. Lifestyle goals:    Follow-up 4 months      [facility]  Анна Randle PA-C          Quality Metrics:

## 2025-01-16 NOTE — PROGRESS NOTES
NAME: GRIFFIN CORTEZ   DOS: 2025  : 1955  PCP: Nava Schaffer MD    Chief Complaint:    Chief Complaint   Patient presents with    Follow-up    LBP/BLE    Leg Pain    Back Pain    Numbness    Tingling       History of Present Illness:  69 y.o. male   I saw a 69-year-old male in neurosurgical consultation he is a  and a smoker who presented with neurogenic claudication predominantly with right leg pain he is undergone conservative therapy and reports marked improvement with the second injection.  He reports that he can walk stand lift and bend and is doing quite well he denies any bowel bladder incontinence he is here for evaluation    PMHX  Allergies:  Allergies   Allergen Reactions    Sulfa Antibiotics Nausea Only and Dizziness    Tramadol Nausea Only    Lorcet [Hydrocodone-Acetaminophen] Palpitations     Medications    Current Outpatient Medications:     aspirin 81 MG EC tablet, Take 1 tablet by mouth Daily., Disp: , Rfl:     carvedilol (COREG) 3.125 MG tablet, Take 1 tablet by mouth 2 (Two) Times a Day With Meals., Disp: 180 tablet, Rfl: 1    etodolac (LODINE) 400 MG tablet, 1 tablet., Disp: , Rfl:     meloxicam (MOBIC) 15 MG tablet, Take 1 tablet by mouth Daily., Disp: , Rfl:     rosuvastatin (CRESTOR) 5 MG tablet, Take 1 tablet by mouth Every Night., Disp: , Rfl:     tiZANidine (ZANAFLEX) 4 MG tablet, , Disp: , Rfl:   Past Medical History:  Past Medical History:   Diagnosis Date    Abnormal ECG     Acid reflux     Arthritis     Long term    Diverticulitis     Fibromyalgia, primary 1998    Hyperlipidemia     Hypertension     Joint pain     Lumbosacral disc disease 2024    Pain on both sides most of the time especially if I sneeze or cough right leg stays numb left leg partially numb    Myocardial infarction 2020    Osteoarthritis     Stroke 10-    TIA (transient ischemic attack)      Past Surgical History:  Past Surgical History:   Procedure Laterality Date     CAROTID ARTERY - SUBCLAVIAN ARTERY BYPASS GRAFT      4-5 years ago    OTHER SURGICAL HISTORY      partial intestinal removal     Social Hx:  Social History     Tobacco Use    Smoking status: Every Day     Current packs/day: 0.50     Average packs/day: 0.5 packs/day for 103.0 years (51.5 ttl pk-yrs)     Types: Cigarettes     Start date: 1/1/1972     Passive exposure: Current    Smokeless tobacco: Never    Tobacco comments:     Trying to quit   Vaping Use    Vaping status: Never Used   Substance Use Topics    Alcohol use: Never    Drug use: Never     Family Hx:  Family History   Problem Relation Age of Onset    Rheumatic fever Mother     Heart disease Mother     Emphysema Father     Prostate cancer Father     COPD Father     Arthritis Maternal Grandfather         Crippling arthritis     Review of Systems:        Review of Systems   Constitutional:  Negative for activity change, appetite change, chills, diaphoresis, fatigue, fever and unexpected weight change.   HENT:  Negative for congestion, dental problem, drooling, ear discharge, ear pain, facial swelling, hearing loss, mouth sores, nosebleeds, postnasal drip, rhinorrhea, sinus pressure, sinus pain, sneezing, sore throat, tinnitus, trouble swallowing and voice change.    Eyes:  Negative for photophobia, pain, discharge, redness, itching and visual disturbance.   Respiratory:  Negative for apnea, cough, choking, chest tightness, shortness of breath, wheezing and stridor.    Cardiovascular:  Negative for chest pain, palpitations and leg swelling.   Gastrointestinal:  Negative for abdominal distention, abdominal pain, anal bleeding, blood in stool, constipation, diarrhea, nausea, rectal pain and vomiting.   Endocrine: Negative for cold intolerance, heat intolerance, polydipsia, polyphagia and polyuria.   Genitourinary:  Negative for decreased urine volume, difficulty urinating, dysuria, enuresis, flank pain, frequency, genital sores, hematuria, penile discharge,  penile pain, penile swelling, scrotal swelling, testicular pain and urgency.   Musculoskeletal:  Negative for arthralgias, back pain, gait problem, joint swelling, myalgias, neck pain and neck stiffness.   Skin:  Negative for color change, pallor, rash and wound.   Allergic/Immunologic: Negative for environmental allergies, food allergies and immunocompromised state.   Neurological:  Negative for dizziness, tremors, seizures, syncope, facial asymmetry, speech difficulty, weakness, light-headedness, numbness and headaches.   Hematological:  Negative for adenopathy. Does not bruise/bleed easily.   Psychiatric/Behavioral:  Negative for agitation, behavioral problems, confusion, decreased concentration, dysphoric mood, hallucinations, self-injury, sleep disturbance and suicidal ideas. The patient is not nervous/anxious and is not hyperactive.           Physical Examination:  There were no vitals filed for this visit.   General Appearance:   Well developed, well nourished, well groomed, alert, and cooperative.  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 Sotelo is a 69 y.o. male   1.  Neurogenic claudication secondary L4-5 central stenosis with a degree of synovial cyst formation to the right side    2.  Tobacco use I explained the importance of smoking cessation to the patient explaining that  smoking decreases the efficacy of surgical therapies not to mention the general health risks.  In addition to this when contemplating fusion surgeries smoking decreases fusion rates and leads to poor outcome, and contributes to complication rate.  I harped  on him about that-again  3.  Significant improvement with nonsurgical pain management strategies physical therapy and injections    I explained the risk benefits and expected outcome of major elective surgery for their problem, complications from approach, and infection, the risk of neurologic implications after surgery as well as need for repeat surgeries and most importantly failure to achieve quality of life improvement from the surgery to the patient.  I talked about the role of laminectomy should his symptoms return I think he be a candidate especially if he is ongoing smoking to do a laminectomy at L4-5 either minimally invasive a right sided approach or mini open, we would need to check a lumbar flexion-extension films just to make sure he did not develop more of a slip    I explained all the complications    After extensive discussion shared decision making plan will be    1.  Ongoing conservative-ism    2.  We get him a direct line to the office if he wishes to pursue surgical management and/or fails to get improvement has return of his leg pain

## 2025-06-16 ENCOUNTER — TELEPHONE (OUTPATIENT)
Dept: CARDIOLOGY | Facility: CLINIC | Age: 70
End: 2025-06-16
Payer: MEDICARE

## 2025-06-16 NOTE — TELEPHONE ENCOUNTER
Received cardiac clearance request from DR SISSY HANSNE stating pt has LEFT SHOULDER SCOPE ROTATOR CUFF REPAIR scheduled for 07/14/2025 and is requiring a cardiac clearance. Placed cardiac clearance request in KATLIN'S inbox to review and address with provider.

## 2025-06-16 NOTE — LETTER
June 19, 2025             To Whom It May Concern:    We build our practice on integrity and patient care. The highest compliment we can receive is the referral of friends, family and patients to our office. We want to take this time to thank you for considering our group as part of your care team.    The medical records for Lyndon Sotelo 1955 were reviewed for pre-operative clearance on 06/19/25. It has been determined that this patient has: ACCEPTABLE RISK    Lyndon Sotelo is currently on the following medications that will need to be held prior to surgery: CONTINUE ASA.    This pre-operative evaluation has been completed based on patient reported medical conditions at the date of this letter, this evaluation may have considered in part results of additional testing, completion of an EKG and patient reported symptoms at the time of their visit.    Lyndon Sotelo's pre-operative clearance is good for thirty(30) days from the date of this letter with no reported changes in health, symptoms or diagnosis from the patient, their primary care provider or your office.    Your office has reported the patient will under go FOR LEFT SHOULDER SCOPE ROTATOR CUFF REPAIR on 07/14/25 with Dr. HANSEN. If this appointment is changed or moved outside of thirty(30) days from 06/19/25 this pre-operative clearance is void.    If you have any further questions please give our office a call.    Thank you for your trust,      DONY Carbone